# Patient Record
Sex: FEMALE | Race: WHITE | URBAN - METROPOLITAN AREA
[De-identification: names, ages, dates, MRNs, and addresses within clinical notes are randomized per-mention and may not be internally consistent; named-entity substitution may affect disease eponyms.]

---

## 2019-12-18 ENCOUNTER — OFFICE VISIT (OUTPATIENT)
Dept: ORTHOPEDIC SURGERY | Age: 79
End: 2019-12-18
Payer: MEDICARE

## 2019-12-18 VITALS
WEIGHT: 135 LBS | BODY MASS INDEX: 25.49 KG/M2 | DIASTOLIC BLOOD PRESSURE: 94 MMHG | SYSTOLIC BLOOD PRESSURE: 156 MMHG | HEART RATE: 75 BPM | HEIGHT: 61 IN

## 2019-12-18 DIAGNOSIS — S52.501A CLOSED FRACTURE OF DISTAL END OF RIGHT RADIUS, UNSPECIFIED FRACTURE MORPHOLOGY, INITIAL ENCOUNTER: ICD-10-CM

## 2019-12-18 DIAGNOSIS — M25.531 RIGHT WRIST PAIN: Primary | ICD-10-CM

## 2019-12-18 PROCEDURE — G8427 DOCREV CUR MEDS BY ELIG CLIN: HCPCS | Performed by: PHYSICIAN ASSISTANT

## 2019-12-18 PROCEDURE — G8417 CALC BMI ABV UP PARAM F/U: HCPCS | Performed by: PHYSICIAN ASSISTANT

## 2019-12-18 PROCEDURE — 1036F TOBACCO NON-USER: CPT | Performed by: PHYSICIAN ASSISTANT

## 2019-12-18 PROCEDURE — G8484 FLU IMMUNIZE NO ADMIN: HCPCS | Performed by: PHYSICIAN ASSISTANT

## 2019-12-18 PROCEDURE — 1090F PRES/ABSN URINE INCON ASSESS: CPT | Performed by: PHYSICIAN ASSISTANT

## 2019-12-18 PROCEDURE — 1123F ACP DISCUSS/DSCN MKR DOCD: CPT | Performed by: PHYSICIAN ASSISTANT

## 2019-12-18 PROCEDURE — 99203 OFFICE O/P NEW LOW 30 MIN: CPT | Performed by: PHYSICIAN ASSISTANT

## 2019-12-18 PROCEDURE — G8400 PT W/DXA NO RESULTS DOC: HCPCS | Performed by: PHYSICIAN ASSISTANT

## 2019-12-18 PROCEDURE — 4040F PNEUMOC VAC/ADMIN/RCVD: CPT | Performed by: PHYSICIAN ASSISTANT

## 2019-12-18 PROCEDURE — L3908 WHO COCK-UP NONMOLDE PRE OTS: HCPCS | Performed by: PHYSICIAN ASSISTANT

## 2019-12-18 RX ORDER — FLUOXETINE HYDROCHLORIDE 20 MG/1
CAPSULE ORAL
COMMUNITY
Start: 2019-09-23

## 2019-12-18 RX ORDER — LOSARTAN POTASSIUM 25 MG/1
1 TABLET ORAL
COMMUNITY
Start: 2019-06-19

## 2019-12-18 RX ORDER — SIMVASTATIN 40 MG
1 TABLET ORAL
COMMUNITY
Start: 2019-09-17

## 2019-12-18 SDOH — HEALTH STABILITY: MENTAL HEALTH: HOW OFTEN DO YOU HAVE A DRINK CONTAINING ALCOHOL?: NEVER

## 2019-12-19 ENCOUNTER — OFFICE VISIT (OUTPATIENT)
Dept: ORTHOPEDIC SURGERY | Age: 79
End: 2019-12-19
Payer: MEDICARE

## 2019-12-19 VITALS
HEIGHT: 61 IN | HEART RATE: 69 BPM | WEIGHT: 135 LBS | RESPIRATION RATE: 16 BRPM | DIASTOLIC BLOOD PRESSURE: 86 MMHG | BODY MASS INDEX: 25.49 KG/M2 | SYSTOLIC BLOOD PRESSURE: 158 MMHG

## 2019-12-19 DIAGNOSIS — S52.501A CLOSED FRACTURE OF DISTAL END OF RIGHT RADIUS, UNSPECIFIED FRACTURE MORPHOLOGY, INITIAL ENCOUNTER: Primary | ICD-10-CM

## 2019-12-19 PROCEDURE — 1090F PRES/ABSN URINE INCON ASSESS: CPT | Performed by: ORTHOPAEDIC SURGERY

## 2019-12-19 PROCEDURE — 25600 CLTX DST RDL FX/EPHYS SEP WO: CPT | Performed by: ORTHOPAEDIC SURGERY

## 2019-12-19 PROCEDURE — 4040F PNEUMOC VAC/ADMIN/RCVD: CPT | Performed by: ORTHOPAEDIC SURGERY

## 2019-12-19 PROCEDURE — 99213 OFFICE O/P EST LOW 20 MIN: CPT | Performed by: ORTHOPAEDIC SURGERY

## 2019-12-19 PROCEDURE — G8400 PT W/DXA NO RESULTS DOC: HCPCS | Performed by: ORTHOPAEDIC SURGERY

## 2019-12-19 PROCEDURE — G8417 CALC BMI ABV UP PARAM F/U: HCPCS | Performed by: ORTHOPAEDIC SURGERY

## 2019-12-19 PROCEDURE — 1123F ACP DISCUSS/DSCN MKR DOCD: CPT | Performed by: ORTHOPAEDIC SURGERY

## 2019-12-19 PROCEDURE — 1036F TOBACCO NON-USER: CPT | Performed by: ORTHOPAEDIC SURGERY

## 2019-12-19 PROCEDURE — G8427 DOCREV CUR MEDS BY ELIG CLIN: HCPCS | Performed by: ORTHOPAEDIC SURGERY

## 2019-12-19 PROCEDURE — G8484 FLU IMMUNIZE NO ADMIN: HCPCS | Performed by: ORTHOPAEDIC SURGERY

## 2020-01-23 ENCOUNTER — TELEPHONE (OUTPATIENT)
Dept: ORTHOPEDIC SURGERY | Age: 80
End: 2020-01-23

## 2020-01-23 NOTE — TELEPHONE ENCOUNTER
Called and left message for Calleen Rummage to call office back    **what exactly is patient wanting to do more? Or what sort of activities? **

## 2020-01-23 NOTE — TELEPHONE ENCOUNTER
Pt is calling to see if she can use her wrist more.   She stated that she wanted to start extending the use of her wrist.

## 2020-02-04 ENCOUNTER — OFFICE VISIT (OUTPATIENT)
Dept: ORTHOPEDIC SURGERY | Age: 80
End: 2020-02-04

## 2020-02-04 VITALS
HEART RATE: 72 BPM | DIASTOLIC BLOOD PRESSURE: 90 MMHG | RESPIRATION RATE: 16 BRPM | BODY MASS INDEX: 24.55 KG/M2 | WEIGHT: 130 LBS | HEIGHT: 61 IN | SYSTOLIC BLOOD PRESSURE: 141 MMHG

## 2020-02-04 PROCEDURE — 99024 POSTOP FOLLOW-UP VISIT: CPT | Performed by: NURSE PRACTITIONER

## 2020-02-04 PROCEDURE — APPNB15 APP NON BILLABLE TIME 0-15 MINS: Performed by: NURSE PRACTITIONER

## 2020-02-04 NOTE — PROGRESS NOTES
CHIEF COMPLAINT: Right wrist pain/ minimally displaced distal radius fracture. DATE OF INJURY: 12/16/2019, DOT 12/19/2019    HISTORY:  Ms. Paige Sim is a 67 y.o.  female right handed who presents today for evaluation of a right wrist injury, which occurred when she fell. She was first seen and evaluated in [address] [place] [address], when she was x-rayed and splinted, and asked to f/u with me. The patient denies any other injuries. She has been in a brace. Rates pain a 0/10 VAS and is doing much better. No numbness or tingling sensation. Past Medical History:   Diagnosis Date    Vasculitis ([address] 2014       History reviewed. No pertinent surgical history.     Social History     Socioeconomic History    Marital status: Single     Spouse name: Not on file    Number of children: Not on file    Years of education: Not on file    Highest education level: Not on file   Occupational History    Not on file   Social Needs    Financial resource strain: Not on file    Food insecurity:     Worry: Not on file     Inability: Not on file    Transportation needs:     Medical: Not on file     Non-medical: Not on file   Tobacco Use    Smoking status: Never Smoker    Smokeless tobacco: Never Used   Substance and Sexual Activity    Alcohol use: Never     Frequency: Never    Drug use: Not on file    Sexual activity: Not on file   Lifestyle    Physical activity:     Days per week: Not on file     Minutes per session: Not on file    Stress: Not on file   Relationships    Social connections:     Talks on phone: Not on file     Gets together: Not on file     Attends Restoration service: Not on file     Active member of club or organization: Not on file     Attends meetings of clubs or organizations: Not on file     Relationship status: Not on file    Intimate partner violence:     Fear of current or ex partner: Not on file     Emotionally abused: Not on file     Physically abused: Not on file     Forced sexual activity: Not on file

## 2020-02-17 ENCOUNTER — TELEPHONE (OUTPATIENT)
Dept: ORTHOPEDIC SURGERY | Age: 80
End: 2020-02-17

## 2020-02-17 NOTE — TELEPHONE ENCOUNTER
Called and spoke with patient. She states she is having shocks in her head when she lays down and thinks it is from her wrist. She states she is being treated by this from her PCP and already had a CT scan of the head and they feel it is due to her healing from the fall. Recommended she continue to f/u with her PCP in regards to this for this is not something Dr Mariza Victoria treats. Pt in agreement to plan.

## 2025-01-09 ENCOUNTER — HOSPITAL ENCOUNTER (INPATIENT)
Age: 85
LOS: 1 days | Discharge: NURSING, REHAB OR LONG-TERM CARE | DRG: 522 | End: 2025-01-10
Attending: EMERGENCY MEDICINE
Payer: MEDICARE

## 2025-01-09 ENCOUNTER — ANESTHESIA (OUTPATIENT)
Dept: OPERATING ROOM | Age: 85
DRG: 522 | End: 2025-01-09
Payer: MEDICARE

## 2025-01-09 ENCOUNTER — APPOINTMENT (OUTPATIENT)
Dept: GENERAL RADIOLOGY | Age: 85
DRG: 522 | End: 2025-01-09
Payer: MEDICARE

## 2025-01-09 ENCOUNTER — ANESTHESIA EVENT (OUTPATIENT)
Dept: OPERATING ROOM | Age: 85
DRG: 522 | End: 2025-01-09
Payer: MEDICARE

## 2025-01-09 DIAGNOSIS — S72.001A CLOSED FRACTURE OF RIGHT HIP, INITIAL ENCOUNTER (HCC): Primary | ICD-10-CM

## 2025-01-09 DIAGNOSIS — S72.001A CLOSED DISPLACED FRACTURE OF RIGHT FEMORAL NECK (HCC): ICD-10-CM

## 2025-01-09 PROBLEM — S72.141A INTERTROCHANTERIC FRACTURE OF RIGHT FEMUR, CLOSED, INITIAL ENCOUNTER (HCC): Status: ACTIVE | Noted: 2025-01-09

## 2025-01-09 LAB
ALBUMIN SERPL-MCNC: 3.7 G/DL (ref 3.4–5)
ALBUMIN/GLOB SERPL: 1.3 {RATIO} (ref 1.1–2.2)
ALP SERPL-CCNC: 64 U/L (ref 40–129)
ALT SERPL-CCNC: 19 U/L (ref 10–40)
ANION GAP SERPL CALCULATED.3IONS-SCNC: 10 MMOL/L (ref 3–16)
AST SERPL-CCNC: 31 U/L (ref 15–37)
BASOPHILS # BLD: 0 K/UL (ref 0–0.2)
BASOPHILS NFR BLD: 0.3 %
BILIRUB SERPL-MCNC: 0.8 MG/DL (ref 0–1)
BUN SERPL-MCNC: 19 MG/DL (ref 7–20)
CALCIUM SERPL-MCNC: 9.1 MG/DL (ref 8.3–10.6)
CHLORIDE SERPL-SCNC: 101 MMOL/L (ref 99–110)
CO2 SERPL-SCNC: 25 MMOL/L (ref 21–32)
CREAT SERPL-MCNC: 0.7 MG/DL (ref 0.6–1.2)
DEPRECATED RDW RBC AUTO: 13 % (ref 12.4–15.4)
EOSINOPHIL # BLD: 0 K/UL (ref 0–0.6)
EOSINOPHIL NFR BLD: 0 %
GFR SERPLBLD CREATININE-BSD FMLA CKD-EPI: 85 ML/MIN/{1.73_M2}
GLUCOSE SERPL-MCNC: 136 MG/DL (ref 70–99)
HCT VFR BLD AUTO: 40 % (ref 36–48)
HGB BLD-MCNC: 13.4 G/DL (ref 12–16)
INR PPP: 1.01 (ref 0.85–1.15)
LYMPHOCYTES # BLD: 0.7 K/UL (ref 1–5.1)
LYMPHOCYTES NFR BLD: 5.7 %
MCH RBC QN AUTO: 31.4 PG (ref 26–34)
MCHC RBC AUTO-ENTMCNC: 33.6 G/DL (ref 31–36)
MCV RBC AUTO: 93.3 FL (ref 80–100)
MONOCYTES # BLD: 0.9 K/UL (ref 0–1.3)
MONOCYTES NFR BLD: 7.6 %
NEUTROPHILS # BLD: 10.2 K/UL (ref 1.7–7.7)
NEUTROPHILS NFR BLD: 86.4 %
PLATELET # BLD AUTO: 193 K/UL (ref 135–450)
PMV BLD AUTO: 9.5 FL (ref 5–10.5)
POTASSIUM SERPL-SCNC: 4.6 MMOL/L (ref 3.5–5.1)
PROT SERPL-MCNC: 6.5 G/DL (ref 6.4–8.2)
PROTHROMBIN TIME: 13.5 SEC (ref 11.9–14.9)
RBC # BLD AUTO: 4.28 M/UL (ref 4–5.2)
SODIUM SERPL-SCNC: 136 MMOL/L (ref 136–145)
WBC # BLD AUTO: 11.8 K/UL (ref 4–11)

## 2025-01-09 PROCEDURE — 71045 X-RAY EXAM CHEST 1 VIEW: CPT

## 2025-01-09 PROCEDURE — 6360000002 HC RX W HCPCS: Performed by: ORTHOPAEDIC SURGERY

## 2025-01-09 PROCEDURE — 96374 THER/PROPH/DIAG INJ IV PUSH: CPT

## 2025-01-09 PROCEDURE — 6360000002 HC RX W HCPCS: Performed by: NURSE PRACTITIONER

## 2025-01-09 PROCEDURE — 36415 COLL VENOUS BLD VENIPUNCTURE: CPT

## 2025-01-09 PROCEDURE — 3600000005 HC SURGERY LEVEL 5 BASE: Performed by: ORTHOPAEDIC SURGERY

## 2025-01-09 PROCEDURE — 0SRR0JZ REPLACEMENT OF RIGHT HIP JOINT, FEMORAL SURFACE WITH SYNTHETIC SUBSTITUTE, OPEN APPROACH: ICD-10-PCS | Performed by: ORTHOPAEDIC SURGERY

## 2025-01-09 PROCEDURE — 6360000002 HC RX W HCPCS

## 2025-01-09 PROCEDURE — 73030 X-RAY EXAM OF SHOULDER: CPT

## 2025-01-09 PROCEDURE — 3700000000 HC ANESTHESIA ATTENDED CARE: Performed by: ORTHOPAEDIC SURGERY

## 2025-01-09 PROCEDURE — 1200000000 HC SEMI PRIVATE

## 2025-01-09 PROCEDURE — 3600000015 HC SURGERY LEVEL 5 ADDTL 15MIN: Performed by: ORTHOPAEDIC SURGERY

## 2025-01-09 PROCEDURE — 73501 X-RAY EXAM HIP UNI 1 VIEW: CPT

## 2025-01-09 PROCEDURE — 2580000003 HC RX 258

## 2025-01-09 PROCEDURE — 85610 PROTHROMBIN TIME: CPT

## 2025-01-09 PROCEDURE — 2500000003 HC RX 250 WO HCPCS: Performed by: ORTHOPAEDIC SURGERY

## 2025-01-09 PROCEDURE — 99285 EMERGENCY DEPT VISIT HI MDM: CPT

## 2025-01-09 PROCEDURE — 3700000001 HC ADD 15 MINUTES (ANESTHESIA): Performed by: ORTHOPAEDIC SURGERY

## 2025-01-09 PROCEDURE — 94760 N-INVAS EAR/PLS OXIMETRY 1: CPT

## 2025-01-09 PROCEDURE — 6370000000 HC RX 637 (ALT 250 FOR IP): Performed by: ORTHOPAEDIC SURGERY

## 2025-01-09 PROCEDURE — 2500000003 HC RX 250 WO HCPCS

## 2025-01-09 PROCEDURE — 73552 X-RAY EXAM OF FEMUR 2/>: CPT

## 2025-01-09 PROCEDURE — 2580000003 HC RX 258: Performed by: NURSE PRACTITIONER

## 2025-01-09 PROCEDURE — 7100000001 HC PACU RECOVERY - ADDTL 15 MIN: Performed by: ORTHOPAEDIC SURGERY

## 2025-01-09 PROCEDURE — 72170 X-RAY EXAM OF PELVIS: CPT

## 2025-01-09 PROCEDURE — 6360000002 HC RX W HCPCS: Performed by: EMERGENCY MEDICINE

## 2025-01-09 PROCEDURE — 80053 COMPREHEN METABOLIC PANEL: CPT

## 2025-01-09 PROCEDURE — 2709999900 HC NON-CHARGEABLE SUPPLY: Performed by: ORTHOPAEDIC SURGERY

## 2025-01-09 PROCEDURE — 7100000000 HC PACU RECOVERY - FIRST 15 MIN: Performed by: ORTHOPAEDIC SURGERY

## 2025-01-09 PROCEDURE — 85025 COMPLETE CBC W/AUTO DIFF WBC: CPT

## 2025-01-09 PROCEDURE — C1776 JOINT DEVICE (IMPLANTABLE): HCPCS | Performed by: ORTHOPAEDIC SURGERY

## 2025-01-09 PROCEDURE — 96375 TX/PRO/DX INJ NEW DRUG ADDON: CPT

## 2025-01-09 DEVICE — AVENIR COMPLETE HIGH OFFSET COLLARED SIZE 3: Type: IMPLANTABLE DEVICE | Site: HIP | Status: FUNCTIONAL

## 2025-01-09 DEVICE — IMPLANTABLE DEVICE: Type: IMPLANTABLE DEVICE | Site: HIP | Status: FUNCTIONAL

## 2025-01-09 DEVICE — STEM FEM UNIV 0+ 28 MM HIP COCR: Type: IMPLANTABLE DEVICE | Site: HIP | Status: FUNCTIONAL

## 2025-01-09 RX ORDER — ACETAMINOPHEN 325 MG/1
650 TABLET ORAL EVERY 6 HOURS
Status: DISCONTINUED | OUTPATIENT
Start: 2025-01-09 | End: 2025-01-10 | Stop reason: HOSPADM

## 2025-01-09 RX ORDER — NALOXONE HYDROCHLORIDE 0.4 MG/ML
INJECTION, SOLUTION INTRAMUSCULAR; INTRAVENOUS; SUBCUTANEOUS PRN
Status: DISCONTINUED | OUTPATIENT
Start: 2025-01-09 | End: 2025-01-09 | Stop reason: HOSPADM

## 2025-01-09 RX ORDER — METHOCARBAMOL 100 MG/ML
INJECTION, SOLUTION INTRAMUSCULAR; INTRAVENOUS
Status: DISCONTINUED | OUTPATIENT
Start: 2025-01-09 | End: 2025-01-09 | Stop reason: SDUPTHER

## 2025-01-09 RX ORDER — SODIUM CHLORIDE 9 MG/ML
INJECTION, SOLUTION INTRAVENOUS PRN
Status: DISCONTINUED | OUTPATIENT
Start: 2025-01-09 | End: 2025-01-10 | Stop reason: HOSPADM

## 2025-01-09 RX ORDER — OXYCODONE HYDROCHLORIDE 5 MG/1
5 TABLET ORAL
Status: DISCONTINUED | OUTPATIENT
Start: 2025-01-09 | End: 2025-01-09 | Stop reason: HOSPADM

## 2025-01-09 RX ORDER — SODIUM CHLORIDE 0.9 % (FLUSH) 0.9 %
5-40 SYRINGE (ML) INJECTION EVERY 12 HOURS SCHEDULED
Status: DISCONTINUED | OUTPATIENT
Start: 2025-01-09 | End: 2025-01-09 | Stop reason: HOSPADM

## 2025-01-09 RX ORDER — LIDOCAINE HYDROCHLORIDE 20 MG/ML
INJECTION, SOLUTION EPIDURAL; INFILTRATION; INTRACAUDAL; PERINEURAL
Status: DISCONTINUED | OUTPATIENT
Start: 2025-01-09 | End: 2025-01-09 | Stop reason: SDUPTHER

## 2025-01-09 RX ORDER — FENTANYL CITRATE 0.05 MG/ML
25 INJECTION, SOLUTION INTRAMUSCULAR; INTRAVENOUS EVERY 5 MIN PRN
Status: DISCONTINUED | OUTPATIENT
Start: 2025-01-09 | End: 2025-01-09 | Stop reason: HOSPADM

## 2025-01-09 RX ORDER — TRANEXAMIC ACID 10 MG/ML
INJECTION, SOLUTION INTRAVENOUS
Status: DISCONTINUED | OUTPATIENT
Start: 2025-01-09 | End: 2025-01-09 | Stop reason: SDUPTHER

## 2025-01-09 RX ORDER — SODIUM CHLORIDE 0.9 % (FLUSH) 0.9 %
5-40 SYRINGE (ML) INJECTION EVERY 12 HOURS SCHEDULED
Status: DISCONTINUED | OUTPATIENT
Start: 2025-01-09 | End: 2025-01-10 | Stop reason: HOSPADM

## 2025-01-09 RX ORDER — SODIUM CHLORIDE 0.9 % (FLUSH) 0.9 %
5-40 SYRINGE (ML) INJECTION PRN
Status: DISCONTINUED | OUTPATIENT
Start: 2025-01-09 | End: 2025-01-09 | Stop reason: HOSPADM

## 2025-01-09 RX ORDER — SODIUM CHLORIDE, SODIUM LACTATE, POTASSIUM CHLORIDE, CALCIUM CHLORIDE 600; 310; 30; 20 MG/100ML; MG/100ML; MG/100ML; MG/100ML
INJECTION, SOLUTION INTRAVENOUS CONTINUOUS
Status: DISCONTINUED | OUTPATIENT
Start: 2025-01-09 | End: 2025-01-09

## 2025-01-09 RX ORDER — LOSARTAN POTASSIUM 25 MG/1
25 TABLET ORAL 2 TIMES DAILY
Status: DISCONTINUED | OUTPATIENT
Start: 2025-01-09 | End: 2025-01-10 | Stop reason: HOSPADM

## 2025-01-09 RX ORDER — PHENYLEPHRINE HCL IN 0.9% NACL 1 MG/10 ML
SYRINGE (ML) INTRAVENOUS
Status: DISCONTINUED | OUTPATIENT
Start: 2025-01-09 | End: 2025-01-09 | Stop reason: SDUPTHER

## 2025-01-09 RX ORDER — ONDANSETRON 2 MG/ML
4 INJECTION INTRAMUSCULAR; INTRAVENOUS
Status: DISCONTINUED | OUTPATIENT
Start: 2025-01-09 | End: 2025-01-09 | Stop reason: HOSPADM

## 2025-01-09 RX ORDER — SODIUM CHLORIDE 9 MG/ML
INJECTION, SOLUTION INTRAVENOUS PRN
Status: DISCONTINUED | OUTPATIENT
Start: 2025-01-09 | End: 2025-01-09 | Stop reason: HOSPADM

## 2025-01-09 RX ORDER — DROPERIDOL 2.5 MG/ML
0.62 INJECTION, SOLUTION INTRAMUSCULAR; INTRAVENOUS
Status: DISCONTINUED | OUTPATIENT
Start: 2025-01-09 | End: 2025-01-09 | Stop reason: HOSPADM

## 2025-01-09 RX ORDER — PROPOFOL 10 MG/ML
INJECTION, EMULSION INTRAVENOUS
Status: DISCONTINUED | OUTPATIENT
Start: 2025-01-09 | End: 2025-01-09 | Stop reason: SDUPTHER

## 2025-01-09 RX ORDER — MAGNESIUM HYDROXIDE 1200 MG/15ML
LIQUID ORAL CONTINUOUS PRN
Status: DISCONTINUED | OUTPATIENT
Start: 2025-01-09 | End: 2025-01-09 | Stop reason: HOSPADM

## 2025-01-09 RX ORDER — ONDANSETRON 2 MG/ML
4 INJECTION INTRAMUSCULAR; INTRAVENOUS ONCE
Status: COMPLETED | OUTPATIENT
Start: 2025-01-09 | End: 2025-01-09

## 2025-01-09 RX ORDER — SODIUM CHLORIDE 9 MG/ML
INJECTION, SOLUTION INTRAVENOUS CONTINUOUS
Status: DISCONTINUED | OUTPATIENT
Start: 2025-01-09 | End: 2025-01-10 | Stop reason: HOSPADM

## 2025-01-09 RX ORDER — DEXAMETHASONE SODIUM PHOSPHATE 4 MG/ML
INJECTION, SOLUTION INTRA-ARTICULAR; INTRALESIONAL; INTRAMUSCULAR; INTRAVENOUS; SOFT TISSUE
Status: DISCONTINUED | OUTPATIENT
Start: 2025-01-09 | End: 2025-01-09 | Stop reason: SDUPTHER

## 2025-01-09 RX ORDER — ACETAMINOPHEN 325 MG/1
650 TABLET ORAL EVERY 6 HOURS PRN
Status: DISCONTINUED | OUTPATIENT
Start: 2025-01-09 | End: 2025-01-10 | Stop reason: HOSPADM

## 2025-01-09 RX ORDER — MORPHINE SULFATE 2 MG/ML
2 INJECTION, SOLUTION INTRAMUSCULAR; INTRAVENOUS ONCE
Status: COMPLETED | OUTPATIENT
Start: 2025-01-09 | End: 2025-01-09

## 2025-01-09 RX ORDER — ONDANSETRON 2 MG/ML
4 INJECTION INTRAMUSCULAR; INTRAVENOUS EVERY 6 HOURS PRN
Status: DISCONTINUED | OUTPATIENT
Start: 2025-01-09 | End: 2025-01-09

## 2025-01-09 RX ORDER — SODIUM CHLORIDE 0.9 % (FLUSH) 0.9 %
5-40 SYRINGE (ML) INJECTION PRN
Status: DISCONTINUED | OUTPATIENT
Start: 2025-01-09 | End: 2025-01-10 | Stop reason: HOSPADM

## 2025-01-09 RX ORDER — FLUOXETINE 10 MG/1
10 CAPSULE ORAL DAILY
Status: DISCONTINUED | OUTPATIENT
Start: 2025-01-09 | End: 2025-01-09

## 2025-01-09 RX ORDER — ONDANSETRON 4 MG/1
4 TABLET, ORALLY DISINTEGRATING ORAL EVERY 8 HOURS PRN
Status: DISCONTINUED | OUTPATIENT
Start: 2025-01-09 | End: 2025-01-09

## 2025-01-09 RX ORDER — OXYCODONE HYDROCHLORIDE 5 MG/1
5 TABLET ORAL EVERY 4 HOURS PRN
Status: DISCONTINUED | OUTPATIENT
Start: 2025-01-09 | End: 2025-01-10 | Stop reason: HOSPADM

## 2025-01-09 RX ORDER — ONDANSETRON 2 MG/ML
4 INJECTION INTRAMUSCULAR; INTRAVENOUS EVERY 6 HOURS PRN
Status: DISCONTINUED | OUTPATIENT
Start: 2025-01-09 | End: 2025-01-10 | Stop reason: HOSPADM

## 2025-01-09 RX ORDER — MORPHINE SULFATE 4 MG/ML
4 INJECTION, SOLUTION INTRAMUSCULAR; INTRAVENOUS EVERY 4 HOURS PRN
Status: DISCONTINUED | OUTPATIENT
Start: 2025-01-09 | End: 2025-01-09

## 2025-01-09 RX ORDER — BRIMONIDINE TARTRATE, TIMOLOL MALEATE 2; 5 MG/ML; MG/ML
1 SOLUTION/ DROPS OPHTHALMIC EVERY 12 HOURS
COMMUNITY
Start: 2024-12-23

## 2025-01-09 RX ORDER — GLYCOPYRROLATE 0.2 MG/ML
INJECTION INTRAMUSCULAR; INTRAVENOUS
Status: DISCONTINUED | OUTPATIENT
Start: 2025-01-09 | End: 2025-01-09 | Stop reason: SDUPTHER

## 2025-01-09 RX ORDER — SODIUM CHLORIDE 0.9 % (FLUSH) 0.9 %
5-40 SYRINGE (ML) INJECTION PRN
Status: DISCONTINUED | OUTPATIENT
Start: 2025-01-09 | End: 2025-01-09

## 2025-01-09 RX ORDER — SUCCINYLCHOLINE/SOD CL,ISO/PF 200MG/10ML
SYRINGE (ML) INTRAVENOUS
Status: DISCONTINUED | OUTPATIENT
Start: 2025-01-09 | End: 2025-01-09 | Stop reason: SDUPTHER

## 2025-01-09 RX ORDER — POLYETHYLENE GLYCOL 3350 17 G/17G
17 POWDER, FOR SOLUTION ORAL DAILY PRN
Status: DISCONTINUED | OUTPATIENT
Start: 2025-01-09 | End: 2025-01-10 | Stop reason: HOSPADM

## 2025-01-09 RX ORDER — MAGNESIUM SULFATE IN WATER 40 MG/ML
2000 INJECTION, SOLUTION INTRAVENOUS PRN
Status: DISCONTINUED | OUTPATIENT
Start: 2025-01-09 | End: 2025-01-10 | Stop reason: HOSPADM

## 2025-01-09 RX ORDER — FENTANYL CITRATE 50 UG/ML
INJECTION, SOLUTION INTRAMUSCULAR; INTRAVENOUS
Status: DISCONTINUED | OUTPATIENT
Start: 2025-01-09 | End: 2025-01-09 | Stop reason: SDUPTHER

## 2025-01-09 RX ORDER — SENNA AND DOCUSATE SODIUM 50; 8.6 MG/1; MG/1
1 TABLET, FILM COATED ORAL 2 TIMES DAILY
Status: DISCONTINUED | OUTPATIENT
Start: 2025-01-09 | End: 2025-01-10 | Stop reason: HOSPADM

## 2025-01-09 RX ORDER — ROCURONIUM BROMIDE 10 MG/ML
INJECTION, SOLUTION INTRAVENOUS
Status: DISCONTINUED | OUTPATIENT
Start: 2025-01-09 | End: 2025-01-09 | Stop reason: SDUPTHER

## 2025-01-09 RX ORDER — OXYCODONE HYDROCHLORIDE 10 MG/1
10 TABLET ORAL EVERY 4 HOURS PRN
Status: DISCONTINUED | OUTPATIENT
Start: 2025-01-09 | End: 2025-01-10 | Stop reason: HOSPADM

## 2025-01-09 RX ORDER — ATORVASTATIN CALCIUM 20 MG/1
20 TABLET, FILM COATED ORAL NIGHTLY
Status: DISCONTINUED | OUTPATIENT
Start: 2025-01-09 | End: 2025-01-10 | Stop reason: HOSPADM

## 2025-01-09 RX ORDER — ASPIRIN 81 MG/1
81 TABLET ORAL 2 TIMES DAILY
Status: DISCONTINUED | OUTPATIENT
Start: 2025-01-10 | End: 2025-01-10 | Stop reason: HOSPADM

## 2025-01-09 RX ORDER — ONDANSETRON 2 MG/ML
INJECTION INTRAMUSCULAR; INTRAVENOUS
Status: DISCONTINUED | OUTPATIENT
Start: 2025-01-09 | End: 2025-01-09 | Stop reason: SDUPTHER

## 2025-01-09 RX ORDER — PROMETHAZINE HYDROCHLORIDE 25 MG/1
12.5 TABLET ORAL EVERY 6 HOURS PRN
Status: DISCONTINUED | OUTPATIENT
Start: 2025-01-09 | End: 2025-01-10 | Stop reason: HOSPADM

## 2025-01-09 RX ORDER — POTASSIUM CHLORIDE 7.45 MG/ML
10 INJECTION INTRAVENOUS PRN
Status: DISCONTINUED | OUTPATIENT
Start: 2025-01-09 | End: 2025-01-10 | Stop reason: HOSPADM

## 2025-01-09 RX ORDER — POTASSIUM CHLORIDE 1500 MG/1
40 TABLET, EXTENDED RELEASE ORAL PRN
Status: DISCONTINUED | OUTPATIENT
Start: 2025-01-09 | End: 2025-01-10 | Stop reason: HOSPADM

## 2025-01-09 RX ORDER — ACETAMINOPHEN 650 MG/1
650 SUPPOSITORY RECTAL EVERY 6 HOURS PRN
Status: DISCONTINUED | OUTPATIENT
Start: 2025-01-09 | End: 2025-01-10 | Stop reason: HOSPADM

## 2025-01-09 RX ADMIN — Medication 200 MCG: at 12:32

## 2025-01-09 RX ADMIN — ACETAMINOPHEN 325MG 650 MG: 325 TABLET ORAL at 17:55

## 2025-01-09 RX ADMIN — Medication 80 MG: at 11:39

## 2025-01-09 RX ADMIN — TRANEXAMIC ACID 1 G: 10 INJECTION, SOLUTION INTRAVENOUS at 12:50

## 2025-01-09 RX ADMIN — ONDANSETRON 4 MG: 2 INJECTION, SOLUTION INTRAMUSCULAR; INTRAVENOUS at 02:02

## 2025-01-09 RX ADMIN — ROCURONIUM BROMIDE 25 MG: 10 SOLUTION INTRAVENOUS at 11:53

## 2025-01-09 RX ADMIN — Medication 200 MCG: at 12:44

## 2025-01-09 RX ADMIN — SODIUM CHLORIDE, POTASSIUM CHLORIDE, SODIUM LACTATE AND CALCIUM CHLORIDE: 600; 310; 30; 20 INJECTION, SOLUTION INTRAVENOUS at 05:46

## 2025-01-09 RX ADMIN — METHOCARBAMOL 50 MG: 100 INJECTION INTRAMUSCULAR; INTRAVENOUS at 12:04

## 2025-01-09 RX ADMIN — Medication 200 MCG: at 12:38

## 2025-01-09 RX ADMIN — Medication 200 MCG: at 12:23

## 2025-01-09 RX ADMIN — METHOCARBAMOL 100 MG: 100 INJECTION INTRAMUSCULAR; INTRAVENOUS at 12:05

## 2025-01-09 RX ADMIN — LIDOCAINE HYDROCHLORIDE 60 MG: 20 INJECTION, SOLUTION EPIDURAL; INFILTRATION; INTRACAUDAL; PERINEURAL at 11:39

## 2025-01-09 RX ADMIN — MORPHINE SULFATE 4 MG: 4 INJECTION, SOLUTION INTRAMUSCULAR; INTRAVENOUS at 04:34

## 2025-01-09 RX ADMIN — PROPOFOL 80 MG: 10 INJECTION, EMULSION INTRAVENOUS at 11:39

## 2025-01-09 RX ADMIN — METHOCARBAMOL 50 MG: 100 INJECTION INTRAMUSCULAR; INTRAVENOUS at 12:03

## 2025-01-09 RX ADMIN — MORPHINE SULFATE 4 MG: 4 INJECTION, SOLUTION INTRAMUSCULAR; INTRAVENOUS at 08:48

## 2025-01-09 RX ADMIN — Medication 200 MCG: at 12:09

## 2025-01-09 RX ADMIN — ONDANSETRON 4 MG: 2 INJECTION INTRAMUSCULAR; INTRAVENOUS at 12:34

## 2025-01-09 RX ADMIN — WATER 2000 MG: 1 INJECTION INTRAMUSCULAR; INTRAVENOUS; SUBCUTANEOUS at 20:28

## 2025-01-09 RX ADMIN — ROCURONIUM BROMIDE 5 MG: 10 SOLUTION INTRAVENOUS at 11:39

## 2025-01-09 RX ADMIN — Medication 200 MCG: at 11:44

## 2025-01-09 RX ADMIN — GLYCOPYRROLATE 0.2 MG: 0.2 INJECTION INTRAMUSCULAR; INTRAVENOUS at 11:53

## 2025-01-09 RX ADMIN — FENTANYL CITRATE 50 MCG: 50 INJECTION INTRAMUSCULAR; INTRAVENOUS at 11:39

## 2025-01-09 RX ADMIN — METHOCARBAMOL 50 MG: 100 INJECTION INTRAMUSCULAR; INTRAVENOUS at 12:11

## 2025-01-09 RX ADMIN — MORPHINE SULFATE 2 MG: 2 INJECTION, SOLUTION INTRAMUSCULAR; INTRAVENOUS at 02:02

## 2025-01-09 RX ADMIN — SUGAMMADEX 200 MG: 100 INJECTION, SOLUTION INTRAVENOUS at 12:59

## 2025-01-09 RX ADMIN — Medication 100 MCG: at 11:54

## 2025-01-09 RX ADMIN — SENNOSIDES AND DOCUSATE SODIUM 1 TABLET: 50; 8.6 TABLET ORAL at 20:30

## 2025-01-09 RX ADMIN — ROCURONIUM BROMIDE 10 MG: 10 SOLUTION INTRAVENOUS at 12:23

## 2025-01-09 RX ADMIN — ACETAMINOPHEN 325MG 650 MG: 325 TABLET ORAL at 23:19

## 2025-01-09 RX ADMIN — Medication 200 MCG: at 12:50

## 2025-01-09 RX ADMIN — TRANEXAMIC ACID 1 G: 10 INJECTION, SOLUTION INTRAVENOUS at 11:45

## 2025-01-09 RX ADMIN — Medication 100 MCG: at 11:42

## 2025-01-09 RX ADMIN — ONDANSETRON 4 MG: 2 INJECTION, SOLUTION INTRAMUSCULAR; INTRAVENOUS at 17:54

## 2025-01-09 RX ADMIN — SODIUM CHLORIDE 2000 MG: 900 INJECTION INTRAVENOUS at 11:46

## 2025-01-09 RX ADMIN — ONDANSETRON 4 MG: 2 INJECTION, SOLUTION INTRAMUSCULAR; INTRAVENOUS at 08:54

## 2025-01-09 RX ADMIN — DEXAMETHASONE SODIUM PHOSPHATE 4 MG: 4 INJECTION, SOLUTION INTRAMUSCULAR; INTRAVENOUS at 11:44

## 2025-01-09 RX ADMIN — ATORVASTATIN CALCIUM 20 MG: 20 TABLET, FILM COATED ORAL at 20:30

## 2025-01-09 ASSESSMENT — PAIN DESCRIPTION - LOCATION
LOCATION: HIP
LOCATION: LEG
LOCATION: HIP

## 2025-01-09 ASSESSMENT — PAIN - FUNCTIONAL ASSESSMENT
PAIN_FUNCTIONAL_ASSESSMENT: 0-10
PAIN_FUNCTIONAL_ASSESSMENT: 0-10
PAIN_FUNCTIONAL_ASSESSMENT: PREVENTS OR INTERFERES WITH MANY ACTIVE NOT PASSIVE ACTIVITIES

## 2025-01-09 ASSESSMENT — PAIN SCALES - GENERAL
PAINLEVEL_OUTOF10: 8
PAINLEVEL_OUTOF10: 7
PAINLEVEL_OUTOF10: 8
PAINLEVEL_OUTOF10: 7
PAINLEVEL_OUTOF10: 0
PAINLEVEL_OUTOF10: 0

## 2025-01-09 ASSESSMENT — PAIN DESCRIPTION - ORIENTATION
ORIENTATION: UPPER;RIGHT
ORIENTATION: RIGHT
ORIENTATION: RIGHT

## 2025-01-09 ASSESSMENT — PAIN DESCRIPTION - DESCRIPTORS
DESCRIPTORS: DISCOMFORT
DESCRIPTORS: BURNING

## 2025-01-09 ASSESSMENT — LIFESTYLE VARIABLES
HOW MANY STANDARD DRINKS CONTAINING ALCOHOL DO YOU HAVE ON A TYPICAL DAY: 1 OR 2
HOW OFTEN DO YOU HAVE A DRINK CONTAINING ALCOHOL: 2-3 TIMES A WEEK

## 2025-01-09 NOTE — PROGRESS NOTES
Pt still very sleeping, will wake up to voice, answers questions and then falls back to sleep easily. Still denies pain or nausea at this time. Vss.

## 2025-01-09 NOTE — ED NOTES
Pt repositioned in bed utilizing the assistance of 4 nurses to prevent pain or injury to broken limb

## 2025-01-09 NOTE — OP NOTE
Anesthesia. The patient was positioned in the lateral decubitus position on the peg board. Appropriate pre op antibiotics were administered per SCIP. The right hip, pelvis and lower extremity were then prepped with ChloraPrep in standard fashion. We then draped out in standard fashion. We sealed off the skin with Ioban. We were then ready for incision. Using standard posterolateral skin incision we incised the skin and coagulated all bleeders with Bovie electrocautery. We then dissected down and split the gluteofemoral fascia in line with the muscle fibers. We then retracted both   anteriorly and posteriorly with the Charnley retractor. The sciatic nerve   was protected at all times. We then identified the piriformis. This was   tagged and released. We then took down the remainder of the short external rotators in standard fashion. We then went ahead and Td the capsule in standard fashion. We then expressed the proximal portion of the femur out of the wound. We did make a neck cut based on templating. We removed all bone laterally. We used our Charnley to open up the medullary canal of the femur. We then used our trochanteric router to ream out the bone laterally. We   then sequentially reamed up to 3 mm. We then sequentially broached up to a size 3 broach. This worked out extremely well. We got nice fit and fill of the femoral canal. We used our calcar planer to plane down the calcar. We did remove the femoral head without difficulty. The femoral head sized to 43 mm. We then   trialed the hip. Leg length appeared symmetric. The hip was   extremely stable, both anteriorly and posteriorly. We then went ahead and removed all trial components. We irrigated the acetabulum and the femoral canal rather copiously. We then were ready for insertion of the actual components. The components were seated without difficulty. We maintained approximately 15 to 20 degrees of anteversion. This worked out extremely well. The hip was  reduced and   again the hip was stable. We closed the capsule with interrupted   figure-of-eight #1 Vicryl stitches. We then closed the piriformis back with   interrupted vertical mattress #1 Vicryl stitches. We then closed the gluteal femoral fascia with a running deep strata fix.  We then closed the subcutaneous tissues with multiple layers of strata fix.  We then closed the skin with Prineo.  Sterile dressings were applied. There   were no complications. The patient was put on an abductor pillow.        Electronically signed by Ramy Jiang MD on 1/9/2025 at 11:18 AM

## 2025-01-09 NOTE — CONSULTS
PATIENT NAME:                     Merari Cummins  YOB: 1940   MEDICAL RECORD#         [id number]  SURGEON:                 Ramy Jiang MD      CHIEF COMPLAINT: right hip pain.     HISTORY OF PRESENT ILLNESS: Ms. Merari Cummins is a  84 y.o. female who tripped while walking in her home.  She landed onto her right hip.  She also hit her right shoulder.  The right shoulder pain has improved significantly.  She was unable to bear weight on the right lower extremity.   She was first seen and evaluated in the emergency room , where she was x-rayed and Orthopedics was consulted. The pain is sharp and not radiating. No other complaints.      PAST MEDICAL HISTORY:   Past Medical History:   Diagnosis Date    Vasculitis ([facility]) 2014        MEDICATIONS: The patient's medication reconciliation form was extensively reviewed and noted.     ALLERGIES:   Allergies   Allergen Reactions    Advil [Ibuprofen]      Kidney issues    Sulfa Antibiotics Hives        SOCIAL HISTORY:   Social History     Socioeconomic History    Marital status: Single     Spouse name: Not on file    Number of children: Not on file    Years of education: Not on file    Highest education level: Not on file   Occupational History    Not on file   Tobacco Use    Smoking status: Never    Smokeless tobacco: Never   Vaping Use    Vaping status: Never Used   Substance and Sexual Activity    Alcohol use: Never    Drug use: Not on file    Sexual activity: Not on file   Other Topics Concern    Not on file   Social History Narrative    Not on file     Social Determinants of Health     Financial Resource Strain: Not on file   Food Insecurity: No Food Insecurity (1/9/2025)    Hunger Vital Sign     Worried About Running Out of Food in the Last Year: Never true     Ran Out of Food in the Last Year: Never true   Transportation Needs: No Transportation Needs (1/9/2025)    PRAPARE - Transportation     Lack of Transportation (Medical): No     Lack of

## 2025-01-09 NOTE — PLAN OF CARE
Problem: Discharge Planning  Goal: Discharge to home or other facility with appropriate resources  1/9/2025 0916 by Selina Call RN  Flowsheets (Taken 1/9/2025 0916)  Discharge to home or other facility with appropriate resources:   Identify barriers to discharge with patient and caregiver   Arrange for needed discharge resources and transportation as appropriate   Identify discharge learning needs (meds, wound care, etc)   Refer to discharge planning if patient needs post-hospital services based on physician order or complex needs related to functional status, cognitive ability or social support system  1/9/2025 0529 by Diana Sanchez RN  Outcome: Progressing  Flowsheets (Taken 1/9/2025 0529)  Discharge to home or other facility with appropriate resources:   Identify barriers to discharge with patient and caregiver   Arrange for needed discharge resources and transportation as appropriate   Identify discharge learning needs (meds, wound care, etc)     Problem: Pain  Goal: Verbalizes/displays adequate comfort level or baseline comfort level  1/9/2025 0916 by Selina Call RN  Flowsheets (Taken 1/9/2025 0916)  Verbalizes/displays adequate comfort level or baseline comfort level:   Encourage patient to monitor pain and request assistance   Assess pain using appropriate pain scale   Administer analgesics based on type and severity of pain and evaluate response   Implement non-pharmacological measures as appropriate and evaluate response   Consider cultural and social influences on pain and pain management   Notify Licensed Independent Practitioner if interventions unsuccessful or patient reports new pain  1/9/2025 0529 by Diana Sanchez RN  Outcome: Progressing  Flowsheets (Taken 1/9/2025 0529)  Verbalizes/displays adequate comfort level or baseline comfort level:   Encourage patient to monitor pain and request assistance   Implement non-pharmacological measures as appropriate and evaluate response    Assess pain using appropriate pain scale   Administer analgesics based on type and severity of pain and evaluate response     Problem: Skin/Tissue Integrity  Goal: Absence of new skin breakdown  Description: 1.  Monitor for areas of redness and/or skin breakdown  2.  Assess vascular access sites hourly  3.  Every 4-6 hours minimum:  Change oxygen saturation probe site  4.  Every 4-6 hours:  If on nasal continuous positive airway pressure, respiratory therapy assess nares and determine need for appliance change or resting period.  1/9/2025 0529 by Diana Sanchez RN  Outcome: Progressing     Problem: Safety - Adult  Goal: Free from fall injury  1/9/2025 0916 by Selina Call RN  Flowsheets (Taken 1/9/2025 0916)  Free From Fall Injury: Instruct family/caregiver on patient safety  1/9/2025 0529 by Diana Sanchez RN  Outcome: Progressing  Flowsheets (Taken 1/9/2025 0529)  Free From Fall Injury: Instruct family/caregiver on patient safety     Problem: ABCDS Injury Assessment  Goal: Absence of physical injury  1/9/2025 0916 by Selina Call RN  Flowsheets (Taken 1/9/2025 0916)  Absence of Physical Injury: Implement safety measures based on patient assessment  1/9/2025 0529 by Diana Sanchez RN  Outcome: Progressing  Flowsheets (Taken 1/9/2025 0529)  Absence of Physical Injury: Implement safety measures based on patient assessment

## 2025-01-09 NOTE — CARE COORDINATION
Received message from yohana De La Cruz--they prefer [facility]. Patient had surgery today. PT/OT pending.     Electronically signed by Marylin Guerrero, ZELALEM, JGW, Case Management on 1/9/2025 at 4:30 PM  [place] [phone]

## 2025-01-09 NOTE — PROGRESS NOTES
Patient left the floor for surgery. Electronically signed by Selina Call RN on 1/9/2025 at 10:14 AM

## 2025-01-09 NOTE — H&P
you been hit, slapped, kicked or otherwise physically hurt by anyone? : No   Housing Stability: Not At Risk (4/24/2024)    Received from [facility] and [facility]    Housing/Utilities     Are you worried about losing your housing? : No     Within the past 12 months, have you ever stayed: outside, in a car, in a tent, in an overnight shelter, or temporarily in someone else's home (i.e. couch-surfing)?: No     Within the past 12 months, have you been unable to get utilities (heat, electricity) when it was really needed?: No       Medications:   Medications:    Infusions:   PRN Meds:     Labs      CBC: No results for input(s): \"WBC\", \"HGB\", \"PLT\" in the last 72 hours.  BMP:  No results for input(s): \"NA\", \"K\", \"CL\", \"CO2\", \"BUN\", \"CREATININE\", \"GLUCOSE\" in the last 72 hours.  Hepatic: No results for input(s): \"AST\", \"ALT\", \"BILITOT\", \"ALKPHOS\" in the last 72 hours.    Invalid input(s): \"ALB\"  Lipids: No results found for: \"CHOL\", \"HDL\", \"TRIG\"  Hemoglobin A1C: No results found for: \"LABA1C\"  TSH: No results found for: \"TSH\"  Troponin: No results found for: \"TROPONINT\"  Lactic Acid: No results for input(s): \"LACTA\" in the last 72 hours.  BNP: No results for input(s): \"PROBNP\" in the last 72 hours.  UA:  Lab Results   Component Value Date/Time    NITRU Negative 05/11/2020 09:53 AM    COLORU YELLOW 05/11/2020 09:53 AM    PHUR 6.0 05/11/2020 09:53 AM    WBCUA 4 05/11/2020 09:53 AM    RBCUA 3 05/11/2020 09:53 AM    CLARITYU Clear 05/11/2020 09:53 AM    LEUKOCYTESUR Negative 05/11/2020 09:53 AM    UROBILINOGEN 0.2 05/11/2020 09:53 AM    BILIRUBINUR Negative 05/11/2020 09:53 AM    BLOODU TRACE 05/11/2020 09:53 AM    GLUCOSEU Negative 05/11/2020 09:53 AM    KETUA Negative 05/11/2020 09:53 AM     Urine Cultures: No results found for: \"LABURIN\"  Blood Cultures: No results found for: \"BC\"  No results found for: \"BLOODCULT2\"  Organism: No results found for: \"ORG\"    Imaging/Diagnostics Last 24 Hours   XR FEMUR RIGHT  (MIN 2 VIEWS)    Result Date: 1/9/2025  EXAMINATION: 2 XRAY VIEWS OF THE RIGHT FEMUR 1/9/2025 2:13 am COMPARISON: None. HISTORY: ORDERING SYSTEM PROVIDED HISTORY: injury, pain TECHNOLOGIST PROVIDED HISTORY: Reason for exam:->injury, pain Reason for Exam: fall FINDINGS: Findings concerning for mildly displaced femoral neck fracture, suboptimally evaluated due to osseous overlap.  The distal femur otherwise appears intact.     Findings concerning for mildly displaced femoral neck fracture, suboptimally evaluated due to osseous overlap.         Electronically signed by Lyentte Murray MD on 1/9/2025 at 4:05 AM

## 2025-01-09 NOTE — PROGRESS NOTES
Patients CHG bath was completed.  Complete linen change, and gown change was completed.  New IV was placed in left arm per patient preference.  Checklist was completed and consent was signed and placed into chart.  Patient denies further needs at this time. Electronically signed by Selina Call RN on 1/9/2025 at 9:08 AM

## 2025-01-09 NOTE — ED PROVIDER NOTES
[facility] EMERGENCY DEPARTMENT    CHIEF COMPLAINT  Fall (Pt arrived via EMS, reports that she tripped and fell apx 12 hours ago when she slipper got caught on a loose piece of dustin; pt states that she was able to walk following the injury and went to work; pt went to bed as normal and woke up at 11pm with intense pain in her right leg and right shoulder; denies LOC, hitting head, or taking blood thinners; advil contraindicated per renal doc)       HISTORY OF PRESENT ILLNESS  Merari Cummins is a 84 y.o. female who presents to the ED complaining of patient presents for evaluation of right hip pain.  Patient states that she fell approximately 12 hours ago.  States that she got her slipper caught on loose dustin and ended up falling.  She states that since she fell she is actually been able to ambulate up until now.  She states that now she is having increased pain in the right hip to the point where she is unable to bear weight.  She denies any head injury denies any loss consciousness denies any associated numbness or weakness.  Pain is described as moderate aggravated with any type of movement.    No other complaints, modifying factors or associated symptoms.     I have reviewed the following from the nursing documentation:n     Past Medical History:   Diagnosis Date    Vasculitis ([facility]) 2014     No past surgical history on file.  Family History   Problem Relation Age of Onset    Stroke Mother     Heart Attack Father      Social History     Socioeconomic History    Marital status: Single     Spouse name: Not on file    Number of children: Not on file    Years of education: Not on file    Highest education level: Not on file   Occupational History    Not on file   Tobacco Use    Smoking status: Never    Smokeless tobacco: Never   Vaping Use    Vaping status: Never Used   Substance and Sexual Activity    Alcohol use: Never    Drug use: Not on file    Sexual activity: Not on file   Other Topics Concern

## 2025-01-09 NOTE — PROGRESS NOTES
Pt states is still very tired but ready to return to inpatient room. Denies pain or nausea at this time, report called to bel robert. Pt taken upstairs via bed by pacu staff, on 3L via NC at time of transfer back to room. On inpatient tele box at time of transfer. No signs of distress noted at time of transfer back to room.

## 2025-01-09 NOTE — ANESTHESIA POSTPROCEDURE EVALUATION
Department of Anesthesiology  Postprocedure Note    Patient: Merari Cummins  MRN: [id number]  YOB: 1940  Date of evaluation: 1/9/2025    Procedure Summary       Date: 01/09/25 Room / Location: [address]    Anesthesia Start: 1132 Anesthesia Stop: 1310    Procedure: RIGHT HIP HEMIARTHROPLASTY (Right: Hip) Diagnosis:       Closed fracture of right hip, initial encounter ([facility])      (Closed fracture of right hip, initial encounter ([facility]) [S72.001A])    Surgeons: Ramy Jiang MD Responsible Provider: Marvel Palacios MD    Anesthesia Type: general ASA Status: 3            Anesthesia Type: No value filed.    Isai Phase I: Isai Score: 8    Isai Phase II:      Anesthesia Post Evaluation    Patient location during evaluation: PACU  Patient participation: complete - patient participated  Level of consciousness: awake and alert  Pain score: 3  Airway patency: patent  Nausea & Vomiting: no nausea and no vomiting  Cardiovascular status: blood pressure returned to baseline  Respiratory status: acceptable  Hydration status: euvolemic  Pain management: adequate    No notable events documented.

## 2025-01-09 NOTE — PROGRESS NOTES
V2.0    [facility] [place]      Name:  Merari Cummins /Age/Sex: 1940  (84 y.o. female)   MRN & CSN:  [id number] & [id number] Encounter Date/Time: 2025 11:25 AM EST   Location:  OR/NONE PCP: Jaquan Smith MD     Attending:Moira Bryan MD       Hospital Day: 1    Assessment and Recommendations   Merari Cummins is a 84 y.o. female with pmh of vasculitis, HTN, HLD, depression who presents with Intertrochanteric fracture of right femur, closed, initial encounter ([facility])    Patient was examined this morning.  Feeling well.    Followed by orthopedic surgery will probably evaluate the patient and plan for patient to undergo surgical correction later today.        Plan:   Intertrochanter fracture of the right femur if  Due to mechanical fall  Plan of multiple portions orthopedic surgery consulted and plan for surgical correction   Pain control  PT OT to evaluate the patient after the surgery  Possible inpatient rehab    2.  Hypertension  Losartan 25 mg twice daily  Will continue to monitor vitals for further management if needed    3.  HLD  Atorvastatin 20 mg daily  .  Continue to follow monitor and manage chronic medical conditions with medication listed below    Diet Diet NPO   DVT Prophylaxis [] Lovenox, []  Heparin, [] SCDs, [] Ambulation,  [] Eliquis, [] Xarelto  [] Coumadin   Code Status Full Code   Disposition From: Home  Expected Disposition: SNF  Estimated Date of Discharge: 1-2 days  Patient requires continued admission due to upon surgical correction   Surrogate Decision Maker/ POA Self area     Personally reviewed Lab Studies and Imaging         Medical Decision Making:  The following items were considered in medical decision making:  Discussion of patient care with other providers  Reviewed clinical lab tests  Reviewed radiology tests  Reviewed other diagnostic tests/interventions  Independent review of radiologic images  Microbiology cultures and other micro tests reviewed      Subjective:     Chief  results found for: \"TSH\"  Troponin: No results found for: \"TROPONINT\"  Lactic Acid: No results for input(s): \"LACTA\" in the last 72 hours.  BNP: No results for input(s): \"PROBNP\" in the last 72 hours.  UA:  Lab Results   Component Value Date/Time    NITRU Negative 05/11/2020 09:53 AM    COLORU YELLOW 05/11/2020 09:53 AM    PHUR 6.0 05/11/2020 09:53 AM    WBCUA 4 05/11/2020 09:53 AM    RBCUA 3 05/11/2020 09:53 AM    CLARITYU Clear 05/11/2020 09:53 AM    LEUKOCYTESUR Negative 05/11/2020 09:53 AM    UROBILINOGEN 0.2 05/11/2020 09:53 AM    BILIRUBINUR Negative 05/11/2020 09:53 AM    BLOODU TRACE 05/11/2020 09:53 AM    GLUCOSEU Negative 05/11/2020 09:53 AM    KETUA Negative 05/11/2020 09:53 AM     Urine Cultures: No results found for: \"LABURIN\"  Blood Cultures: No results found for: \"BC\"  No results found for: \"BLOODCULT2\"  Organism: No results found for: \"ORG\"      Electronically signed by Moira Bryan MD on 1/9/2025 at 11:25 AM  Comment: Please note this report has been produced using speech recognition software and may contain errors related to that system including errors in grammar, punctuation, and spelling, as well as words and phrases that may be inappropriate. If there are any questions or concerns, please feel free to contact the dictating provider for clarification.

## 2025-01-09 NOTE — PROGRESS NOTES
Patient was admitted at 0500 to room 3129 via stretcher. Pt oriented to room, call light, policies and procedures, the menu and ordering. Call light within reach. Bed in lowest position, bed alarm on, and wheels locked. Pt verbalized understanding. No complaints, questions, or concerns at this time.

## 2025-01-09 NOTE — PLAN OF CARE
Problem: Discharge Planning  Goal: Discharge to home or other facility with appropriate resources  Outcome: Progressing  Flowsheets (Taken 1/9/2025 0529)  Discharge to home or other facility with appropriate resources:   Identify barriers to discharge with patient and caregiver   Arrange for needed discharge resources and transportation as appropriate   Identify discharge learning needs (meds, wound care, etc)     Problem: Pain  Goal: Verbalizes/displays adequate comfort level or baseline comfort level  Outcome: Progressing  Flowsheets (Taken 1/9/2025 0529)  Verbalizes/displays adequate comfort level or baseline comfort level:   Encourage patient to monitor pain and request assistance   Implement non-pharmacological measures as appropriate and evaluate response   Assess pain using appropriate pain scale   Administer analgesics based on type and severity of pain and evaluate response     Problem: Skin/Tissue Integrity  Goal: Absence of new skin breakdown  Description: 1.  Monitor for areas of redness and/or skin breakdown  2.  Assess vascular access sites hourly  3.  Every 4-6 hours minimum:  Change oxygen saturation probe site  4.  Every 4-6 hours:  If on nasal continuous positive airway pressure, respiratory therapy assess nares and determine need for appliance change or resting period.  Outcome: Progressing     Problem: Safety - Adult  Goal: Free from fall injury  Outcome: Progressing  Flowsheets (Taken 1/9/2025 0529)  Free From Fall Injury: Instruct family/caregiver on patient safety     Problem: ABCDS Injury Assessment  Goal: Absence of physical injury  Outcome: Progressing  Flowsheets (Taken 1/9/2025 0529)  Absence of Physical Injury: Implement safety measures based on patient assessment

## 2025-01-09 NOTE — ED NOTES
ED TO INPATIENT SBAR HANDOFF    Patient Name: Merari Cummins   Preferred Name: Merari  : 1940  84 y.o.   Family/Caregiver Present: no   Code Status Order: No Order  PO Status: NPO:No  Telemetry Order:   C-SSRS: Risk of Suicide: No Risk  Sitter no  n/a  Restraints:     Sepsis Risk Score      Situation  Chief Complaint   Patient presents with    Fall     Pt arrived via EMS, reports that she tripped and fell apx 12 hours ago when she slipper got caught on a loose piece of dustin; pt states that she was able to walk following the injury and went to work; pt went to bed as normal and woke up at 11pm with intense pain in her right leg and right shoulder; denies LOC, hitting head, or taking blood thinners; advil contraindicated per renal doc     Brief Description of Patient's Condition: Fracture near head of right femur  Mental Status: oriented  Arrived from:Home  Imaging:   XR FEMUR RIGHT (MIN 2 VIEWS)   Final Result   Findings concerning for mildly displaced femoral neck fracture, suboptimally   evaluated due to osseous overlap.         XR CHEST 1 VIEW    (Results Pending)   XR PELVIS (1-2 VIEWS)    (Results Pending)   XR SHOULDER RIGHT (MIN 2 VIEWS)    (Results Pending)     Abnormal labs: Abnormal Labs Reviewed - No data to display    Background  Allergies:   Allergies   Allergen Reactions    Advil [Ibuprofen]      Kidney issues    Sulfa Antibiotics Hives     History:   Past Medical History:   Diagnosis Date    Vasculitis (HCC)        Assessment  Vitals: MEWS Score: 1  Level of Consciousness: Alert (0)   Vitals:    25 0118   BP: (!) 143/82   Pulse: 72   Resp: 16   Temp: 97.7 °F (36.5 °C)   TempSrc: Oral   SpO2: 98%   Weight: 54.4 kg (120 lb)   Height: 1.524 m (5')     Deterioration Index (DI): Deterioration Index: 18.66  Deterioration Index (DI) Interventions Performed:    O2 Flow Rate:    O2 Device:    Cardiac Rhythm:    Critical Lab Results: [unfilled]  Cultures: Cultures:None  NIH Score: NIH     Active

## 2025-01-09 NOTE — PROGRESS NOTES
4 Eyes Skin Assessment     NAME:  Merari Cummins  YOB: 1940  MEDICAL RECORD NUMBER:  [id number]    The patient is being assessed for  Admission    I agree that at least one RN has performed a thorough Head to Toe Skin Assessment on the patient. ALL assessment sites listed below have been assessed.      Areas assessed by both nurses:    Head, Face, Ears, Shoulders, Back, Chest, Arms, Elbows, Hands, Sacrum. Buttock, Coccyx, Ischium, Legs. Feet and Heels, and Under Medical Devices         Does the Patient have a Wound? No noted wound(s)       Salo Prevention initiated by RN: Yes  Wound Care Orders initiated by RN: No    Pressure Injury (Stage 3,4, Unstageable, DTI, NWPT, and Complex wounds) if present, place Wound referral order by RN under : No    New Ostomies, if present place, Ostomy referral order under : No     Nurse 1 eSignature: Electronically signed by Diana Sanchez RN on 1/9/25 at 5:28 AM EST    **SHARE this note so that the co-signing nurse can place an eSignature**    Nurse 2 eSignature: {Esignature:[id number]}

## 2025-01-09 NOTE — PROGRESS NOTES
In bed. Alert and oriented. States fell in kitchen at home and injured her hip. She understands plan for hip surgery today per Dr Jiang and is agreeable to proceed. NPO for surgery

## 2025-01-09 NOTE — PROGRESS NOTES
Patient had not urinated prior to going down to surgery or returning.  When patient was bladder scanned, she was retaining 710mL.  Patient stated if she sat down she could urinate.  Patient was taken to the bathroom via stedy x2 and tolerated well.  Upon returning to bed a post void scan was completed that showed 0mL.  Patient sitting up in bed tolerating jello. Electronically signed by Selina Call RN on 1/9/2025 at 6:03 PM

## 2025-01-09 NOTE — PROGRESS NOTES
Pt to pacu from OR. Pt asleep at arrival, will open eyes to voice but falls back to sleep. Sating 88% RA at arrival, placed on 4L via Nc per CRNA. Placed on monitor, vss. Dressing to right hip clean dry and intact, ice pack applied. Abductor pillow in place. No signs of distress noted at this time, report obtained.

## 2025-01-09 NOTE — PLAN OF CARE
[facility] Orthopedic Surgery  Plan of Care Note          Orthopedic Plan of Care Note     Merari Cummins 84 y.o. presented to ED for fall    Imaging reviewed, and showed right femoral neck fracture, displaced    Plan:  - Admitted to medicine  -Keep NPO for hemiarthroplasty toady with Dr Ramy Jiang    Thank you very much for the kind consultation and allowing me to participate in this patient's care.  I will continue to keep you apprised of her progress.           Mariana Reese MD , MD 1/9/2025 7:19 AM

## 2025-01-09 NOTE — ANESTHESIA PRE PROCEDURE
Department of Anesthesiology  Preprocedure Note       Name:  Merari Cummins   Age:  84 y.o.  :  1940                                          MRN:  [id number]         Date:  2025      Surgeon: Surgeon(s):  Ramy Jiang MD    Procedure: Procedure(s):  RIGHT HIP HEMIARTHROPLASTY    Medications prior to admission:   Prior to Admission medications    Medication Sig Start Date End Date Taking? Authorizing Provider   losartan (COZAAR) 25 MG tablet Take 1 tablet by mouth in the morning and at bedtime 19  Yes ProviderRaymond MD   simvastatin (ZOCOR) 40 MG tablet Take 1 tablet by mouth daily 19  Yes ProviderRaymond MD       Current medications:    Current Facility-Administered Medications   Medication Dose Route Frequency Provider Last Rate Last Admin    losartan (COZAAR) tablet 25 mg  25 mg Oral BID Lynette Murray MD        atorvastatin (LIPITOR) tablet 20 mg  20 mg Oral Nightly Lynette Murray MD        sodium chloride flush 0.9 % injection 5-40 mL  5-40 mL IntraVENous 2 times per day Lynette Murray MD        sodium chloride flush 0.9 % injection 5-40 mL  5-40 mL IntraVENous PRN Lynette Murray MD        0.9 % sodium chloride infusion   IntraVENous PRN Lynette Murray MD        potassium chloride (KLOR-CON M) extended release tablet 40 mEq  40 mEq Oral PRN Lynette Murray MD        Or    potassium bicarb-citric acid (EFFER-K) effervescent tablet 40 mEq  40 mEq Oral PRN Lynette Murray MD        Or    potassium chloride 10 mEq/100 mL IVPB (Peripheral Line)  10 mEq IntraVENous PRN Lnyette Murray MD        magnesium sulfate 2000 mg in 50 mL IVPB premix  2,000 mg IntraVENous PRN Lynette Murray MD        ondansetron (ZOFRAN-ODT) disintegrating tablet 4 mg  4 mg Oral Q8H PRN Lynette Murray MD        Or    ondansetron (ZOFRAN) injection 4 mg  4 mg IntraVENous Q6H PRN Lynette Murray MD   4 mg at

## 2025-01-09 NOTE — CARE COORDINATION
Case Management Assessment  Initial Evaluation    Date/Time of Evaluation: 1/9/2025 3:39 PM  Assessment Completed by: ARTEM Hussein    If patient is discharged prior to next notation, then this note serves as note for discharge by case management.    Patient Name: Merari Cummins                   YOB: 1940  Diagnosis: Intertrochanteric fracture of right femur, closed, initial encounter ([facility]) [S72.141A]                   Date / Time: 1/9/2025  1:13 AM    Patient Admission Status: Inpatient   Readmission Risk (Low < 19, Mod (19-27), High > 27): Readmission Risk Score: 10    Current PCP: Jaquan Smith MD  PCP verified by CM? Yes    Chart Reviewed: Yes      History Provided by: Patient, Medical Record, Child/Family  Patient Orientation: Alert and Oriented    Patient Cognition: Alert    Hospitalization in the last 30 days (Readmission):  No    If yes, Readmission Assessment in  Navigator will be completed.    Advance Directives:      Code Status: Full Code   Patient's Primary Decision Maker is: Legal Next of Kin    Primary Decision Maker: Lacie Khan - Niece/Nephew - [phone]    Primary Decision Maker: Dorothy Palm - Niece/Nephew - [phone]    Discharge Planning:    Patient lives with: Alone Type of Home: House  Primary Care Giver: Self  Patient Support Systems include: Family Members   Current Financial resources: Medicare  Current community resources: None  Current services prior to admission: None            Current DME:              Type of Home Care services:  None    ADLS  Prior functional level: Independent in ADLs/IADLs  Current functional level: Assistance with the following:, Cooking, Housework, Mobility    PT AM-PAC:   /24  OT AM-PAC:   /24    Family can provide assistance at DC: No  Would you like Case Management to discuss the discharge plan with any other family members/significant others, and if so, who? Yes  Plans to Return to Present Housing: Unknown at present  Other  TRINIF Done OU---[facility]  Dr. Snyder

## 2025-01-10 ENCOUNTER — HOSPITAL ENCOUNTER (INPATIENT)
Age: 85
LOS: 8 days | Discharge: HOME HEALTH | DRG: 560 | End: 2025-01-18
Attending: PHYSICAL MEDICINE & REHABILITATION | Admitting: PHYSICAL MEDICINE & REHABILITATION
Payer: MEDICARE

## 2025-01-10 VITALS
BODY MASS INDEX: 23.63 KG/M2 | OXYGEN SATURATION: 94 % | SYSTOLIC BLOOD PRESSURE: 138 MMHG | WEIGHT: 120.37 LBS | TEMPERATURE: 98 F | RESPIRATION RATE: 17 BRPM | DIASTOLIC BLOOD PRESSURE: 64 MMHG | HEART RATE: 87 BPM | HEIGHT: 60 IN

## 2025-01-10 PROBLEM — S72.141A CLOSED DISPLACED INTERTROCHANTERIC FRACTURE OF RIGHT FEMUR, INITIAL ENCOUNTER (HCC): Status: ACTIVE | Noted: 2025-01-10

## 2025-01-10 LAB
ALBUMIN SERPL-MCNC: 3.5 G/DL (ref 3.4–5)
ALBUMIN/GLOB SERPL: 1.3 {RATIO} (ref 1.1–2.2)
ALP SERPL-CCNC: 55 U/L (ref 40–129)
ALT SERPL-CCNC: 21 U/L (ref 10–40)
ANION GAP SERPL CALCULATED.3IONS-SCNC: 11 MMOL/L (ref 3–16)
APTT BLD: 24.8 SEC (ref 22.1–36.4)
AST SERPL-CCNC: 46 U/L (ref 15–37)
BASOPHILS # BLD: 0.1 K/UL (ref 0–0.2)
BASOPHILS NFR BLD: 0.6 %
BILIRUB SERPL-MCNC: 0.4 MG/DL (ref 0–1)
BUN SERPL-MCNC: 31 MG/DL (ref 7–20)
CALCIUM SERPL-MCNC: 8.8 MG/DL (ref 8.3–10.6)
CHLORIDE SERPL-SCNC: 101 MMOL/L (ref 99–110)
CO2 SERPL-SCNC: 24 MMOL/L (ref 21–32)
CREAT SERPL-MCNC: 1.1 MG/DL (ref 0.6–1.2)
DEPRECATED RDW RBC AUTO: 13.4 % (ref 12.4–15.4)
EOSINOPHIL # BLD: 0 K/UL (ref 0–0.6)
EOSINOPHIL NFR BLD: 0 %
GFR SERPLBLD CREATININE-BSD FMLA CKD-EPI: 50 ML/MIN/{1.73_M2}
GLUCOSE SERPL-MCNC: 107 MG/DL (ref 70–99)
HCT VFR BLD AUTO: 36.2 % (ref 36–48)
HGB BLD-MCNC: 11.9 G/DL (ref 12–16)
INR PPP: 1.05 (ref 0.85–1.15)
LYMPHOCYTES # BLD: 1.1 K/UL (ref 1–5.1)
LYMPHOCYTES NFR BLD: 7.9 %
MCH RBC QN AUTO: 31.2 PG (ref 26–34)
MCHC RBC AUTO-ENTMCNC: 33 G/DL (ref 31–36)
MCV RBC AUTO: 94.5 FL (ref 80–100)
MONOCYTES # BLD: 1.5 K/UL (ref 0–1.3)
MONOCYTES NFR BLD: 11.3 %
NEUTROPHILS # BLD: 11 K/UL (ref 1.7–7.7)
NEUTROPHILS NFR BLD: 80.2 %
PLATELET # BLD AUTO: 176 K/UL (ref 135–450)
PMV BLD AUTO: 10 FL (ref 5–10.5)
POTASSIUM SERPL-SCNC: 4.4 MMOL/L (ref 3.5–5.1)
PROT SERPL-MCNC: 6.2 G/DL (ref 6.4–8.2)
PROTHROMBIN TIME: 13.9 SEC (ref 11.9–14.9)
RBC # BLD AUTO: 3.83 M/UL (ref 4–5.2)
SODIUM SERPL-SCNC: 136 MMOL/L (ref 136–145)
WBC # BLD AUTO: 13.7 K/UL (ref 4–11)

## 2025-01-10 PROCEDURE — 6370000000 HC RX 637 (ALT 250 FOR IP): Performed by: ORTHOPAEDIC SURGERY

## 2025-01-10 PROCEDURE — 97116 GAIT TRAINING THERAPY: CPT

## 2025-01-10 PROCEDURE — 6370000000 HC RX 637 (ALT 250 FOR IP): Performed by: FAMILY MEDICINE

## 2025-01-10 PROCEDURE — 97530 THERAPEUTIC ACTIVITIES: CPT

## 2025-01-10 PROCEDURE — 85025 COMPLETE CBC W/AUTO DIFF WBC: CPT

## 2025-01-10 PROCEDURE — 94760 N-INVAS EAR/PLS OXIMETRY 1: CPT

## 2025-01-10 PROCEDURE — 1280000000 HC REHAB R&B

## 2025-01-10 PROCEDURE — 85730 THROMBOPLASTIN TIME PARTIAL: CPT

## 2025-01-10 PROCEDURE — 6370000000 HC RX 637 (ALT 250 FOR IP): Performed by: PHYSICAL MEDICINE & REHABILITATION

## 2025-01-10 PROCEDURE — 6360000002 HC RX W HCPCS: Performed by: PHYSICAL MEDICINE & REHABILITATION

## 2025-01-10 PROCEDURE — 97166 OT EVAL MOD COMPLEX 45 MIN: CPT

## 2025-01-10 PROCEDURE — 85610 PROTHROMBIN TIME: CPT

## 2025-01-10 PROCEDURE — 2500000003 HC RX 250 WO HCPCS: Performed by: PHYSICAL MEDICINE & REHABILITATION

## 2025-01-10 PROCEDURE — 2500000003 HC RX 250 WO HCPCS: Performed by: ORTHOPAEDIC SURGERY

## 2025-01-10 PROCEDURE — 97535 SELF CARE MNGMENT TRAINING: CPT

## 2025-01-10 PROCEDURE — 97162 PT EVAL MOD COMPLEX 30 MIN: CPT

## 2025-01-10 PROCEDURE — 80053 COMPREHEN METABOLIC PANEL: CPT

## 2025-01-10 PROCEDURE — 36415 COLL VENOUS BLD VENIPUNCTURE: CPT

## 2025-01-10 PROCEDURE — 6360000002 HC RX W HCPCS: Performed by: ORTHOPAEDIC SURGERY

## 2025-01-10 RX ORDER — BISACODYL 10 MG
10 SUPPOSITORY, RECTAL RECTAL DAILY PRN
Status: CANCELLED | OUTPATIENT
Start: 2025-01-10

## 2025-01-10 RX ORDER — BRIMONIDINE TARTRATE 2 MG/ML
1 SOLUTION/ DROPS OPHTHALMIC 2 TIMES DAILY
Status: CANCELLED | OUTPATIENT
Start: 2025-01-10

## 2025-01-10 RX ORDER — ENOXAPARIN SODIUM 100 MG/ML
40 INJECTION SUBCUTANEOUS DAILY
Status: CANCELLED | OUTPATIENT
Start: 2025-01-10

## 2025-01-10 RX ORDER — SODIUM CHLORIDE 0.9 % (FLUSH) 0.9 %
5-40 SYRINGE (ML) INJECTION PRN
Status: DISCONTINUED | OUTPATIENT
Start: 2025-01-10 | End: 2025-01-18 | Stop reason: HOSPADM

## 2025-01-10 RX ORDER — TIMOLOL MALEATE 5 MG/ML
1 SOLUTION/ DROPS OPHTHALMIC 2 TIMES DAILY
Status: DISCONTINUED | OUTPATIENT
Start: 2025-01-10 | End: 2025-01-10 | Stop reason: HOSPADM

## 2025-01-10 RX ORDER — ASPIRIN 81 MG/1
81 TABLET ORAL 2 TIMES DAILY
Status: DISCONTINUED | OUTPATIENT
Start: 2025-01-10 | End: 2025-01-18 | Stop reason: HOSPADM

## 2025-01-10 RX ORDER — ASPIRIN 81 MG/1
81 TABLET ORAL 2 TIMES DAILY
Status: CANCELLED | OUTPATIENT
Start: 2025-01-10

## 2025-01-10 RX ORDER — TIMOLOL MALEATE 5 MG/ML
1 SOLUTION/ DROPS OPHTHALMIC 2 TIMES DAILY
Status: CANCELLED | OUTPATIENT
Start: 2025-01-10

## 2025-01-10 RX ORDER — VITAMIN B COMPLEX
2000 TABLET ORAL DAILY
Status: DISCONTINUED | OUTPATIENT
Start: 2025-01-11 | End: 2025-01-18 | Stop reason: HOSPADM

## 2025-01-10 RX ORDER — SENNA AND DOCUSATE SODIUM 50; 8.6 MG/1; MG/1
1 TABLET, FILM COATED ORAL 2 TIMES DAILY
Status: DISCONTINUED | OUTPATIENT
Start: 2025-01-10 | End: 2025-01-13

## 2025-01-10 RX ORDER — TIMOLOL MALEATE 5 MG/ML
1 SOLUTION/ DROPS OPHTHALMIC 2 TIMES DAILY
Status: DISCONTINUED | OUTPATIENT
Start: 2025-01-10 | End: 2025-01-18 | Stop reason: HOSPADM

## 2025-01-10 RX ORDER — POLYETHYLENE GLYCOL 3350 17 G/17G
17 POWDER, FOR SOLUTION ORAL DAILY PRN
Status: DISCONTINUED | OUTPATIENT
Start: 2025-01-10 | End: 2025-01-18 | Stop reason: HOSPADM

## 2025-01-10 RX ORDER — SODIUM CHLORIDE 0.9 % (FLUSH) 0.9 %
5-40 SYRINGE (ML) INJECTION EVERY 12 HOURS SCHEDULED
Status: CANCELLED | OUTPATIENT
Start: 2025-01-10

## 2025-01-10 RX ORDER — POLYETHYLENE GLYCOL 3350 17 G/17G
17 POWDER, FOR SOLUTION ORAL DAILY PRN
Status: CANCELLED | OUTPATIENT
Start: 2025-01-10

## 2025-01-10 RX ORDER — ATORVASTATIN CALCIUM 20 MG/1
20 TABLET, FILM COATED ORAL NIGHTLY
Status: CANCELLED | OUTPATIENT
Start: 2025-01-10

## 2025-01-10 RX ORDER — VITAMIN B COMPLEX
2000 TABLET ORAL DAILY
Status: DISCONTINUED | OUTPATIENT
Start: 2025-01-10 | End: 2025-01-10 | Stop reason: HOSPADM

## 2025-01-10 RX ORDER — TRAMADOL HYDROCHLORIDE 50 MG/1
50 TABLET ORAL EVERY 4 HOURS PRN
Status: CANCELLED | OUTPATIENT
Start: 2025-01-10

## 2025-01-10 RX ORDER — PROMETHAZINE HYDROCHLORIDE 25 MG/1
12.5 TABLET ORAL EVERY 6 HOURS PRN
Status: CANCELLED | OUTPATIENT
Start: 2025-01-10

## 2025-01-10 RX ORDER — ACETAMINOPHEN 325 MG/1
650 TABLET ORAL EVERY 6 HOURS PRN
Status: DISCONTINUED | OUTPATIENT
Start: 2025-01-10 | End: 2025-01-18 | Stop reason: HOSPADM

## 2025-01-10 RX ORDER — ACETAMINOPHEN 325 MG/1
650 TABLET ORAL EVERY 6 HOURS PRN
Status: CANCELLED | OUTPATIENT
Start: 2025-01-10

## 2025-01-10 RX ORDER — SODIUM CHLORIDE 9 MG/ML
INJECTION, SOLUTION INTRAVENOUS PRN
Status: DISCONTINUED | OUTPATIENT
Start: 2025-01-10 | End: 2025-01-18 | Stop reason: HOSPADM

## 2025-01-10 RX ORDER — LOSARTAN POTASSIUM 25 MG/1
25 TABLET ORAL 2 TIMES DAILY
Status: CANCELLED | OUTPATIENT
Start: 2025-01-10

## 2025-01-10 RX ORDER — SODIUM CHLORIDE 0.9 % (FLUSH) 0.9 %
5-40 SYRINGE (ML) INJECTION EVERY 12 HOURS SCHEDULED
Status: DISCONTINUED | OUTPATIENT
Start: 2025-01-10 | End: 2025-01-18

## 2025-01-10 RX ORDER — BISACODYL 10 MG
10 SUPPOSITORY, RECTAL RECTAL DAILY PRN
Status: DISCONTINUED | OUTPATIENT
Start: 2025-01-10 | End: 2025-01-18 | Stop reason: HOSPADM

## 2025-01-10 RX ORDER — MULTIVIT-MIN/IRON/FOLIC ACID/K 18-600-40
2000 CAPSULE ORAL DAILY
COMMUNITY

## 2025-01-10 RX ORDER — HYDRALAZINE HYDROCHLORIDE 10 MG/1
10 TABLET, FILM COATED ORAL EVERY 6 HOURS PRN
Status: DISCONTINUED | OUTPATIENT
Start: 2025-01-10 | End: 2025-01-18 | Stop reason: HOSPADM

## 2025-01-10 RX ORDER — SODIUM CHLORIDE 0.9 % (FLUSH) 0.9 %
5-40 SYRINGE (ML) INJECTION PRN
Status: CANCELLED | OUTPATIENT
Start: 2025-01-10

## 2025-01-10 RX ORDER — ONDANSETRON 2 MG/ML
4 INJECTION INTRAMUSCULAR; INTRAVENOUS EVERY 6 HOURS PRN
Status: DISCONTINUED | OUTPATIENT
Start: 2025-01-10 | End: 2025-01-18 | Stop reason: HOSPADM

## 2025-01-10 RX ORDER — ENOXAPARIN SODIUM 100 MG/ML
40 INJECTION SUBCUTANEOUS DAILY
Status: DISCONTINUED | OUTPATIENT
Start: 2025-01-10 | End: 2025-01-10

## 2025-01-10 RX ORDER — ATORVASTATIN CALCIUM 20 MG/1
20 TABLET, FILM COATED ORAL NIGHTLY
Status: DISCONTINUED | OUTPATIENT
Start: 2025-01-10 | End: 2025-01-18 | Stop reason: HOSPADM

## 2025-01-10 RX ORDER — ONDANSETRON 2 MG/ML
4 INJECTION INTRAMUSCULAR; INTRAVENOUS EVERY 6 HOURS PRN
Status: CANCELLED | OUTPATIENT
Start: 2025-01-10

## 2025-01-10 RX ORDER — ASPIRIN 81 MG/1
81 TABLET ORAL 2 TIMES DAILY
Qty: 60 TABLET | Refills: 0 | Status: ON HOLD | OUTPATIENT
Start: 2025-01-10 | End: 2025-01-17

## 2025-01-10 RX ORDER — ENOXAPARIN SODIUM 100 MG/ML
30 INJECTION SUBCUTANEOUS DAILY
Status: DISCONTINUED | OUTPATIENT
Start: 2025-01-10 | End: 2025-01-18 | Stop reason: HOSPADM

## 2025-01-10 RX ORDER — TRAMADOL HYDROCHLORIDE 50 MG/1
50 TABLET ORAL EVERY 4 HOURS PRN
Status: DISCONTINUED | OUTPATIENT
Start: 2025-01-10 | End: 2025-01-18 | Stop reason: HOSPADM

## 2025-01-10 RX ORDER — LOSARTAN POTASSIUM 25 MG/1
25 TABLET ORAL 2 TIMES DAILY
Status: DISCONTINUED | OUTPATIENT
Start: 2025-01-10 | End: 2025-01-11

## 2025-01-10 RX ORDER — SENNA AND DOCUSATE SODIUM 50; 8.6 MG/1; MG/1
1 TABLET, FILM COATED ORAL 2 TIMES DAILY
Status: CANCELLED | OUTPATIENT
Start: 2025-01-10

## 2025-01-10 RX ORDER — SODIUM CHLORIDE 9 MG/ML
INJECTION, SOLUTION INTRAVENOUS PRN
Status: CANCELLED | OUTPATIENT
Start: 2025-01-10

## 2025-01-10 RX ORDER — BRIMONIDINE TARTRATE 2 MG/ML
1 SOLUTION/ DROPS OPHTHALMIC 2 TIMES DAILY
Status: DISCONTINUED | OUTPATIENT
Start: 2025-01-10 | End: 2025-01-18 | Stop reason: HOSPADM

## 2025-01-10 RX ORDER — HYDRALAZINE HYDROCHLORIDE 10 MG/1
10 TABLET, FILM COATED ORAL EVERY 6 HOURS PRN
Status: CANCELLED | OUTPATIENT
Start: 2025-01-10

## 2025-01-10 RX ORDER — OXYCODONE HYDROCHLORIDE 5 MG/1
5 TABLET ORAL EVERY 6 HOURS PRN
Qty: 20 TABLET | Refills: 0 | Status: ON HOLD | OUTPATIENT
Start: 2025-01-10 | End: 2025-01-17 | Stop reason: HOSPADM

## 2025-01-10 RX ORDER — VITAMIN B COMPLEX
2000 TABLET ORAL DAILY
Status: CANCELLED | OUTPATIENT
Start: 2025-01-11

## 2025-01-10 RX ORDER — BRIMONIDINE TARTRATE 2 MG/ML
1 SOLUTION/ DROPS OPHTHALMIC 2 TIMES DAILY
Status: DISCONTINUED | OUTPATIENT
Start: 2025-01-10 | End: 2025-01-10 | Stop reason: HOSPADM

## 2025-01-10 RX ORDER — PROMETHAZINE HYDROCHLORIDE 25 MG/1
12.5 TABLET ORAL EVERY 6 HOURS PRN
Status: DISCONTINUED | OUTPATIENT
Start: 2025-01-10 | End: 2025-01-18 | Stop reason: HOSPADM

## 2025-01-10 RX ADMIN — BRIMONIDINE TARTRATE 1 DROP: 2 SOLUTION OPHTHALMIC at 21:14

## 2025-01-10 RX ADMIN — SODIUM CHLORIDE, PRESERVATIVE FREE 10 ML: 5 INJECTION INTRAVENOUS at 21:14

## 2025-01-10 RX ADMIN — ASPIRIN 81 MG: 81 TABLET, COATED ORAL at 21:13

## 2025-01-10 RX ADMIN — SENNOSIDES AND DOCUSATE SODIUM 1 TABLET: 50; 8.6 TABLET ORAL at 09:32

## 2025-01-10 RX ADMIN — WATER 2000 MG: 1 INJECTION INTRAMUSCULAR; INTRAVENOUS; SUBCUTANEOUS at 04:47

## 2025-01-10 RX ADMIN — ACETAMINOPHEN 650 MG: 325 TABLET ORAL at 15:48

## 2025-01-10 RX ADMIN — ACETAMINOPHEN 325MG 650 MG: 325 TABLET ORAL at 09:32

## 2025-01-10 RX ADMIN — LOSARTAN POTASSIUM 25 MG: 25 TABLET, FILM COATED ORAL at 21:13

## 2025-01-10 RX ADMIN — TIMOLOL MALEATE 1 DROP: 5 SOLUTION OPHTHALMIC at 21:14

## 2025-01-10 RX ADMIN — ACETAMINOPHEN 650 MG: 325 TABLET ORAL at 22:12

## 2025-01-10 RX ADMIN — TIMOLOL MALEATE 1 DROP: 5 SOLUTION OPHTHALMIC at 12:27

## 2025-01-10 RX ADMIN — BRIMONIDINE TARTRATE 1 DROP: 2 SOLUTION OPHTHALMIC at 12:27

## 2025-01-10 RX ADMIN — ATORVASTATIN CALCIUM 20 MG: 20 TABLET, FILM COATED ORAL at 21:14

## 2025-01-10 RX ADMIN — ONDANSETRON 4 MG: 2 INJECTION, SOLUTION INTRAMUSCULAR; INTRAVENOUS at 00:11

## 2025-01-10 RX ADMIN — ASPIRIN 81 MG: 81 TABLET, COATED ORAL at 09:32

## 2025-01-10 RX ADMIN — SENNOSIDES AND DOCUSATE SODIUM 1 TABLET: 50; 8.6 TABLET ORAL at 15:49

## 2025-01-10 RX ADMIN — SENNOSIDES AND DOCUSATE SODIUM 1 TABLET: 50; 8.6 TABLET ORAL at 21:13

## 2025-01-10 RX ADMIN — ACETAMINOPHEN 325MG 650 MG: 325 TABLET ORAL at 04:50

## 2025-01-10 RX ADMIN — Medication 2000 UNITS: at 12:27

## 2025-01-10 RX ADMIN — ENOXAPARIN SODIUM 30 MG: 100 INJECTION SUBCUTANEOUS at 15:48

## 2025-01-10 ASSESSMENT — PAIN - FUNCTIONAL ASSESSMENT: PAIN_FUNCTIONAL_ASSESSMENT: ACTIVITIES ARE NOT PREVENTED

## 2025-01-10 ASSESSMENT — PAIN DESCRIPTION - ORIENTATION
ORIENTATION: RIGHT
ORIENTATION: RIGHT

## 2025-01-10 ASSESSMENT — PAIN SCALES - GENERAL
PAINLEVEL_OUTOF10: 7
PAINLEVEL_OUTOF10: 6
PAINLEVEL_OUTOF10: 3

## 2025-01-10 ASSESSMENT — PAIN DESCRIPTION - PAIN TYPE: TYPE: ACUTE PAIN;SURGICAL PAIN

## 2025-01-10 ASSESSMENT — PAIN DESCRIPTION - LOCATION
LOCATION: BACK;HIP
LOCATION: HIP

## 2025-01-10 ASSESSMENT — PAIN DESCRIPTION - ONSET: ONSET: GRADUAL

## 2025-01-10 ASSESSMENT — PAIN DESCRIPTION - DESCRIPTORS
DESCRIPTORS: ACHING;DISCOMFORT
DESCRIPTORS: ACHING

## 2025-01-10 ASSESSMENT — PAIN DESCRIPTION - FREQUENCY: FREQUENCY: INTERMITTENT

## 2025-01-10 NOTE — PLAN OF CARE
Problem: Discharge Planning  Goal: Discharge to home or other facility with appropriate resources  Outcome: Progressing     Problem: Safety - Adult  Goal: Free from fall injury  Outcome: Progressing     Problem: Skin/Tissue Integrity  Goal: Absence of new skin breakdown  Description: 1.  Monitor for areas of redness and/or skin breakdown  2.  Assess vascular access sites hourly  3.  Every 4-6 hours minimum:  Change oxygen saturation probe site  4.  Every 4-6 hours:  If on nasal continuous positive airway pressure, respiratory therapy assess nares and determine need for appliance change or resting period.  Outcome: Progressing     Problem: ABCDS Injury Assessment  Goal: Absence of physical injury  Outcome: Progressing     Problem: Musculoskeletal - Adult  Goal: Return mobility to safest level of function  Outcome: Progressing

## 2025-01-10 NOTE — PROGRESS NOTES
[facility] Orthopedic Surgery   Progress Note      S/P :  SUBJECTIVE  in recliner. Alert and oriented. . Pain is   described in right hip and with the intensity of moderate. Pain is described as aching.       OBJECTIVE              Physical                      VITALS:  /63   Pulse 76   Temp 98 °F (36.7 °C) (Oral)   Resp 17   Ht 1.524 m (5')   Wt 54.6 kg (120 lb 5.9 oz)   SpO2 93%   BMI 23.51 kg/m²                     MUSCULOSKELETAL:  right foot NVI. Wiggles toes to command. Able to plantarflex and dorsiflex ankle Pedal pulses are palpable.                    NEUROLOGIC:                                  Sensory:  Touch:  Right Lower Extremity:  normal                                                 Surgical wound appears clean and dry right hip with Prineo dressing. Ice pack on.     Data       CBC:   Lab Results   Component Value Date/Time    WBC 13.7 01/10/2025 05:16 AM    RBC 3.83 01/10/2025 05:16 AM    HGB 11.9 01/10/2025 05:16 AM    HCT 36.2 01/10/2025 05:16 AM    MCV 94.5 01/10/2025 05:16 AM    MCH 31.2 01/10/2025 05:16 AM    MCHC 33.0 01/10/2025 05:16 AM    RDW 13.4 01/10/2025 05:16 AM     01/10/2025 05:16 AM    MPV 10.0 01/10/2025 05:16 AM        WBC:    Lab Results   Component Value Date/Time    WBC 13.7 01/10/2025 05:16 AM        Hemoglobin/Hematocrit:    Lab Results   Component Value Date/Time    HGB 11.9 01/10/2025 05:16 AM    HCT 36.2 01/10/2025 05:16 AM        PT/INR:    Lab Results   Component Value Date/Time    PROTIME 13.9 01/10/2025 05:16 AM    INR 1.05 01/10/2025 05:16 AM              Current Inpatient Medications             Current Facility-Administered Medications: Vitamin D (CHOLECALCIFEROL) tablet 2,000 Units, 2,000 Units, Oral, Daily  brimonidine (ALPHAGAN) 0.2 % ophthalmic solution 1 drop, 1 drop, Both Eyes, BID **AND** timolol (TIMOPTIC) 0.5 % ophthalmic solution 1 drop, 1 drop, Both Eyes, BID  losartan (COZAAR) tablet 25 mg, 25 mg, Oral, BID  atorvastatin (LIPITOR) tablet  20 mg, 20 mg, Oral, Nightly  sodium chloride flush 0.9 % injection 5-40 mL, 5-40 mL, IntraVENous, 2 times per day  0.9 % sodium chloride infusion, , IntraVENous, PRN  potassium chloride (KLOR-CON M) extended release tablet 40 mEq, 40 mEq, Oral, PRN **OR** potassium bicarb-citric acid (EFFER-K) effervescent tablet 40 mEq, 40 mEq, Oral, PRN **OR** potassium chloride 10 mEq/100 mL IVPB (Peripheral Line), 10 mEq, IntraVENous, PRN  magnesium sulfate 2000 mg in 50 mL IVPB premix, 2,000 mg, IntraVENous, PRN  polyethylene glycol (GLYCOLAX) packet 17 g, 17 g, Oral, Daily PRN  acetaminophen (TYLENOL) tablet 650 mg, 650 mg, Oral, Q6H PRN **OR** acetaminophen (TYLENOL) suppository 650 mg, 650 mg, Rectal, Q6H PRN  0.9 % sodium chloride infusion, , IntraVENous, Continuous  sodium chloride flush 0.9 % injection 5-40 mL, 5-40 mL, IntraVENous, 2 times per day  sodium chloride flush 0.9 % injection 5-40 mL, 5-40 mL, IntraVENous, PRN  0.9 % sodium chloride infusion, , IntraVENous, PRN  acetaminophen (TYLENOL) tablet 650 mg, 650 mg, Oral, Q6H  oxyCODONE (ROXICODONE) immediate release tablet 5 mg, 5 mg, Oral, Q4H PRN **OR** oxyCODONE HCl (OXY-IR) immediate release tablet 10 mg, 10 mg, Oral, Q4H PRN  HYDROmorphone (DILAUDID) injection 0.25 mg, 0.25 mg, IntraVENous, Q3H PRN **OR** HYDROmorphone (DILAUDID) injection 0.5 mg, 0.5 mg, IntraVENous, Q3H PRN  sennosides-docusate sodium (SENOKOT-S) 8.6-50 MG tablet 1 tablet, 1 tablet, Oral, BID  promethazine (PHENERGAN) tablet 12.5 mg, 12.5 mg, Oral, Q6H PRN **OR** ondansetron (ZOFRAN) injection 4 mg, 4 mg, IntraVENous, Q6H PRN  aspirin EC tablet 81 mg, 81 mg, Oral, BID    ASSESSMENT AND PLAN    Post right hip hemiarthroplasty per Dr Jiang, Stable exam  DVT prophylaxis ordered,  ASA 81mg twice at day for 30 days for DVT prophylaxis   PT OT for ADL's and ambulation as tolerated, WBAT, posterior hip precautions  SS for DC planning, ECF/rehab planned.   IV or PO pain med as ordered   Followup

## 2025-01-10 NOTE — CONSULTS
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 She fell approximately 12 hours before coming into the ER.  States that she got her slipper caught on loose dustin resulting in fall. Denies head trauma or loss of consciousness.  Initially able to ambulate but then hip became progressively more painful and she was unable to bear weight on it.  Diagnostic work-up revealed displaced right femoral neck fracture. Patient underwent right hemiarthroplasty (1/9 with Dr. Jiang). Now WBAT RLE with posterlateral hip precautions. Course complicated by leukocytosis, anemia, labile blood pressure. Currently, patient reports mild-moderate right hip pain. Worse with movement and weight bearing. Improves with rest and acetaminophen. She also has more mild right shoulder pain. She denies any sensory changes. She is interested in coming to ARU to improve her function prior to returning home.     Prior Level of Function:  Independent for mobility, ADLs, and IADLs  Continues to work 12 hours a week at the [employer] and is an active     Current Level of Function:  Functional transfers and ambulation with CGA with RW  Anticipate up to max assist for full ADL    Pertinent Social History:  Support: lives alone  Home set-up: multi level [place] but she doesn't use the second level; 3 MISTI with left handrail    Past Medical History:   Diagnosis Date    Vasculitis (HCC) 2014       Past Surgical History:   Procedure Laterality Date    HIP SURGERY Right 1/9/2025    RIGHT HIP HEMIARTHROPLASTY performed by [facility]y Jiang MD at [facility] OR       Family History   Problem Relation Age of Onset    Stroke Mother     Heart Attack  -- -- -- 99 20 90 % --   01/09/25 1307 127/62 98.7 °F (37.1 °C) Temporal (!) 112 17 (!) 88 % --   01/09/25 1029 136/73 99.9 °F (37.7 °C) Oral 81 18 92 % --     Const: Alert. WDWN. No distress  Eyes: Conjunctiva noninjected, no icterus noted; pupils equal   HENT: Atraumatic, normocephalic; Oral mucosa moist  Neck: Trachea midline, neck supple. No thyromegaly noted.  CV: Regular rate and rhythm, no murmur rub or gallop noted  Resp: Lungs clear to auscultation bilaterally, no rales wheezes or rhonchi, no retractions. Respirations unlabored.   GI: Soft, nontender, nondistended. Normal bowel sounds. No palpable masses.   Skin: Normal temperature and turgor. No rashes or breakdown noted on exposed areas.   Ext: Post-op swelling right hip. No significant edema appreciated in distal BLE. No varicosities.  MSK: Post op swelling right hip. Decreased right hip ROM. No erythema, warmth noted.   Neuro: Alert, oriented, appropriate. No cranial nerve deficits appreciated. Sensation intact to light touch. Motor examination reveals strength 5/5 in BUE, Righ hip limited by pain/precautions but 5/5 in distal RLE, 5/5 LLE except 4/5 left hip flexion.   Psych: Stable mood, normal judgement, normal affect     Lab Results   Component Value Date    WBC 13.7 (H) 01/10/2025    HGB 11.9 (L) 01/10/2025    HCT 36.2 01/10/2025    MCV 94.5 01/10/2025     01/10/2025     Lab Results   Component Value Date    INR 1.05 01/10/2025    INR 1.01 01/09/2025    PROTIME 13.9 01/10/2025    PROTIME 13.5 01/09/2025     Lab Results   Component Value Date    CREATININE 1.1 01/10/2025    BUN 31 (H) 01/10/2025     01/10/2025    K 4.4 01/10/2025     01/10/2025    CO2 24 01/10/2025     Lab Results   Component Value Date    ALT 21 01/10/2025    AST 46 (H) 01/10/2025    ALKPHOS 55 01/10/2025    BILITOT 0.4 01/10/2025         Most recent imaging studies revealed:    XR SHOULDER RIGHT (MIN 2 VIEWS)  Result Date: 1/9/2025  1.  Limited positioning of

## 2025-01-10 NOTE — PROGRESS NOTES
Patient admitted to rehab with closed fracture of R hip.  A/Ox4. Transfers with Stedy x1, gaitbelt. Mobility restrictions: posterolateral approach hip precautions. On Regular diet, tolerating well. Medications taken whole with water. On lovenox for DVT prophylaxis.  Skin: Surgical incision to R hip, closed with surgical glue. Well approximated. Bruising to site. Oxygen: n/a. LDA: R AC and L FA PIVs. Has been continent of bowel and of bladder. LBM 01.08.2025 per patient. Chair/bed alarms in use and call light in reach. Will monitor for safety.     Electronically signed by Cheryle Hill RN on 1/10/2025 at 2:36 PM

## 2025-01-10 NOTE — PROGRESS NOTES
ADMISSION ORIENTATION    Merari Cummins  MEDICAL RECORD NUMBER:  [id number]  AGE: 84 y.o.   GENDER: female  : 1940  TODAYS DATE:  1/10/2025      Room Orientation     Patient admitted to room 3256 per [] Wheel Chair  [] Bed  [x] Recliner  [] Stretcher  [] Other: n/a.     Transferred to:  [x] Bed  [] Recliner  [] Other: n/a.     Patient Required minimum assistance with Laurita Roay x1 and gaitbelt.     Patient was oriented to:  [x] Call Light  [x] Room Phone  [x] TV  [x] Thermostat  [x] Bathroom  [x] Bed and Chair Alarms per Rehab Policy  [x] Closet  [x] White Board and it's Use on Rehab  [] Other:    Patient Verbalized Understanding: Yes  Family Present: No      Rehab Orientation     [x] 3 Hours of Therapy  through   [x] Focus and Specialty of Physical, Occupational, and Speech and Language Pathology  [x] Weekly Team Conference and Discharge Planning  [x] Roles of the Rehab Doctor, Consulting Doctors, Nursing, Dietary, and Social Work  [x] Everyday Clothing, including proper footwear, for Therapy  [x] Visiting Hours, Visitor Ages, and Visitors Sleeping Overnight in the Hospital  [x] Visitor Participation in Therapy  [x]  and Sundays on Rehab  [x] Introduction of Welcome Folder, including Rehab Expectations, Nurse Manager's Welcome Letter, Visitor's Guide, and Menu Service  [x] Planning Ahead and Ordering Meals  [x] Falls Contract [x] Signed, [x] Copied, and [] Taped to Closet Door  [] Other:    Patient Verbalized Understanding: Yes  Family Present: No    Electronically signed by Cheryle Hill RN on 1/10/2025 at 2:08 PM

## 2025-01-10 NOTE — PROGRESS NOTES
Occupational Therapy  Facility/Department: [address] ORTHOPEDICS  Occupational Therapy Initial Assessment    Name: Merari Cummins  : 1940  MRN: [id number]  Date of Service: 1/10/2025    Discharge Recommendations:  Patient would benefit from continued therapy after discharge, 5-7 sessions per week  OT Equipment Recommendations  Other: cont to assess...     Merari Cummins scored a 17/24 on the AM-PAC ADL Inpatient form. Current research shows that an AM-PAC score of 17 or less is typically not associated with a discharge to the patient's home setting. Based on the patient's AM-PAC score and their current ADL deficits, it is recommended that the patient have 5-7 sessions per week of Occupational Therapy at d/c to increase the patient's independence.  At this time, this patient demonstrates complex nursing, medical, and rehabilitative needs, and would benefit from intensive rehabilitation services upon discharge from the Inpatient setting.  This patient demonstrates the ability to participate in and benefit from an intensive therapy program with a coordinated interdisciplinary team approach to foster frequent, structured, and documented communication among disciplines, who will work together to establish, prioritize, and achieve treatment goals. Please see assessment section for further patient specific details.    If patient discharges prior to next session this note will serve as a discharge summary.  Please see below for the latest assessment towards goals.      Patient Diagnosis(es): intertrochanteric fx of R femur  Past Medical History:  has a past medical history of Vasculitis (HCC).  Past Surgical History:  has a past surgical history that includes hip surgery (Right, 2025).    Treatment Diagnosis: impaired strength, balance, activity tolerance, ADL/IADLs, mobility/txs      Assessment  Performance deficits / Impairments: Decreased functional mobility ;Decreased endurance;Decreased ADL status;Decreased

## 2025-01-10 NOTE — DISCHARGE INSTR - COC
[facility] Continuity of Care Form    Patient Name:  Merari Cummins  : 1940    MRN:  [id number]    Admit date:  2025  Discharge date:  ***    Code Status Order: Full Code  Advance Directives: {YES OR NO:}    Admitting Physician: Lynette Murray MD  PCP: Jaquan Smtih MD    Discharging Nurse: ***  Discharging Hospital Unit/Room#: T6I-9979/3129-01  Discharging Unit Phone Number: ***    Emergency Contact:        Past Surgical History:  Past Surgical History:   Procedure Laterality Date    HIP SURGERY Right 2025    RIGHT HIP HEMIARTHROPLASTY performed by Ramy Jiang MD at [facility] OR       Immunization History:   Immunization History   Administered Date(s) Administered    COVID-19, MODERNA BLUE border, Primary or Immunocompromised, (age 12y+), IM, 100 mcg/0.5mL 2021, 2021    COVID-19, MODERNA PURPLE border, (age 18y+ booster, 6y-11y series), IM, 50 mcg/0.5 mL 10/30/2021, 2022, 2023    COVID-19, PFIZER Bivalent, DO NOT Dilute, (age 12y+), IM, 30 mcg/0.3 mL 10/19/2022    COVID-19, PFIZER, (age 12y+), IM, 30mcg/0.3mL 2024       Active Problems:  Principal Problem:    Intertrochanteric fracture of right femur, closed, initial encounter ([facility])  Active Problems:    Closed fracture of right hip (HCC)  Resolved Problems:    * No resolved hospital problems. *      Isolation/Infection:       Nurse Assessment:  Last Vital Signs:/63   Pulse 76   Temp 98 °F (36.7 °C) (Oral)   Resp 17   Ht 1.524 m (5')   Wt 54.6 kg (120 lb 5.9 oz)   SpO2 93%   BMI 23.51 kg/m²   Last documented pain score (0-10 scale): Pain Level: 3  Last Weight:   Wt Readings from Last 1 Encounters:   01/10/25 54.6 kg (120 lb 5.9 oz)     Mental Status:  {IP PT MENTAL STATUS:}     IV Access:  {MH MEGHAN IV ACCESS:545993352}    Nursing Mobility/ADLs:  Walking   {P DME ADLs:042531998}  Transfer  {[facility] DME ADLs:256742344}  Bathing  {P DME ADLs:239815319}  Dressing  {P DME  application of ZO hose with pt.     Patient's personal belongings (please select all that are sent with patient):  {CHP DME Belongings:662407528}    RN SIGNATURE:  {Esignature:[id number]}    PHYSICIAN SECTION    Prognosis: Good    Condition at Discharge: Stable    Rehab Potential (if transferring to Rehab): Good    Physician Certification: I certify the above orders, information, and transfer of Merari Cummins is necessary for the continuing treatment of the diagnosis listed and that he requires Skilled Nursing Facility for less 30 days.     Update Admission H&P: No change in H&P    PHYSICIAN SIGNATURE:  Electronically signed by DARYC Rowe CNP on 1/10/25 at 10:18 AM EST/ Dr Deidre Bryan MD 1/10/2025, 11:57 AM    CASE MANAGEMENT/SOCIAL WORK SECTION    Inpatient Status Date: ***    isinger Readmission Risk Assessment Score:    Discharging to Facility/ Agency   Name:   Address:  Phone:  Fax:    Dialysis Facility (if applicable)   Name:  Address:  Dialysis Schedule:  Phone:  Fax:    / signature: {Esignature:[id number]}    Followup Dr Jiang in office 2 weeks post hip surgery  [facility], [address], [address]   [id number],   [phone]         DISCHARGE INSTRUCTIONS FOR TOTAL JOINT REPLACEMENT  Activity:  Wear knee hi ZO hose daily and remove at night for 2 weeks post surgery  Elevate your leg if swelling occurs in your ankle.    Continue the exercise program as prescribed by physical therapists.  Take frequent walks.  Use walker, crutches, or cane with weight bearing instructions as indicated by the physical therapists.  Take rest periods often.   Incentive spirometer 4 times a day for 10 breaths for 1 weeks post surgery  Wear BIPAP/CPAP at all times when sleeping or napping if you have sleep apnea AND have a BIPAP/CPAP at home  Do not drive until cleared by your surgeon.    Wound Care:  Check the incision every day for drainage

## 2025-01-10 NOTE — PROGRESS NOTES
Physical Therapy    Facility/Department: [address] ORTHOPEDICS    Physical Therapy Initial Assessment      Name: Merari Cummins    : 1940    MRN: [id number]    Date of Service: 1/10/2025    Assessment / Discharge Recommendations:    -right hip fracture ORIF by hemiarthroplasty  wbat  and posterior precautions     -good start mobilizing from bed to walker to initiate ambulation in room   -recommend continued PT OT and nursing care in a skilled setting  -able to tolerate and benefit from a HIGH frequency of sessions for goal to return home independently         Patient Diagnosis(es):   Past Medical History:  has a past medical history of Vasculitis (HCC).  Past Surgical History:  has a past surgical history that includes hip surgery (Right, 2025).      Body Structures, Functions, Activity Limitations Requiring Skilled Therapeutic Intervention: Decreased functional mobility ;Decreased strength;Increased pain;Decreased ADL status;Decreased ROM;Decreased balance  Therapy Prognosis: Good  Decision Making: Medium Complexity  Requires PT Follow-Up: Yes  Activity Tolerance  Activity Tolerance: Patient tolerated treatment well    Plan  Physical Therapy Plan  Current Treatment Recommendations: Strengthening, Functional mobility training, Transfer training, ADL/Self-care training, Gait training, Positioning, Modalities, Therapeutic activities, Patient/Caregiver education & training  Additional Comments: 3-5 while on acute floor  Safety Devices  Type of Devices: All fall risk precautions in place, Call light within reach, Chair alarm in place, Left in chair, Nurse notified    Restrictions  Restrictions/Precautions  Restrictions/Precautions: Fall Risk, Weight Bearing  Lower Extremity Weight Bearing Restrictions  Right Lower Extremity Weight Bearing: Weight Bearing As Tolerated  Position Activity Restriction  Hip Precautions: Posterior hip precautions  Other Position/Activity Restrictions: No adduction, flexion beyond 90  limbs     Bed mobility  Supine to Sit: Contact guard assistance  Sit to Supine: Unable to assess  Scooting: Stand by assistance  Transfers  Sit to Stand: Contact guard assistance (cues for hand placements)  Stand to Sit: Contact guard assistance  Ambulation  Surface: Level tile  Device: Rolling Walker  Assistance: Contact guard assistance  Quality of Gait: step to pattern with fair heel contact and fair weight bearing   - very short step length for comfort  Distance: ~10 feet bed to toilet  Comments: overall stable  More Ambulation?: No  Stairs/Curb  Stairs?: No     Balance  Comments: midline in sitting at the EOB and in static stance on the walker   -dynamic is fair using rolling walker at close cga     -cold pack to right posteriolateral hip PRN   -ankle pumps at 30 each hour in slow pace     AM-[facility] Basic Mobility - Inpatient   How much help is needed turning from your back to your side while in a flat bed without using bedrails?: A Lot  How much help is needed moving from lying on your back to sitting on the side of a flat bed without using bedrails?: A Little  How much help is needed moving to and from a bed to a chair?: A Little  How much help is needed standing up from a chair using your arms?: A Little  How much help is needed walking in hospital room?: A Little  How much help is needed climbing 3-5 steps with a railing?: Total  AM-PAC Inpatient Mobility Raw Score : 15  AM-PAC Inpatient T-Scale Score : 39.45  Mobility Inpatient CMS 0-100% Score: 57.7  Mobility Inpatient CMS G-Code Modifier : CK    Goals  Short Term Goals  Time Frame for Short Term Goals: 1-2 days  Short Term Goal 1: bed mobility at [facility]  Short Term Goal 2: transfers at [facility]  Short Term Goal 3: ambulation at [facility] wbat rolling walker for 25 feet  Short Term Goal 4: exercises at supervision  Short Term Goal 5: verbalize and demonstrate posterior precautions  Patient Goals   Patient Goals : get well and back home and to her usual activity

## 2025-01-10 NOTE — PROGRESS NOTES
Ethnicity  \"Are you of , /a, or Citizen of [place] origin?\"  Check all that apply:  [x] A.  No, not of , /a, or Citizen of [place] Origin  [] B.  Yes, Mauritanian, Mauritanian American, Chicano/a  [] C.  Yes, Bruneian  [] D.  Yes, Edwin  [] E.  Yes, another , , or Citizen of [place] origin  [] X.  Patient unable to respond    Race  \"What is your race?\"  Check all that apply:  [x] A.  White  [] B.  Black or   [] C.   or   [] D.     [] E.  Chinese  [] F.  Niuean  [] G. Croatian  [] H.  Hungarian  [] I.  Syrian  [] J.  Other   [] K.    [] L.  Zambian or Kody  [] M.  Nauruan  [] N.  Other   [] X.  Patient unable to respond    High Risk Drug Classes:  Use and Indication    Is taking: Check if the pt is taking any medications by pharmacological classification, not how it is used, in the following classes  Indication noted: If column 1 is checked, check if there is an indication noted for all meds in the drug class Is taking  (check all that apply) Indication noted (check all that apply)   Antipsychotic [] []   Anticoagulant [x] []   Antibiotic [] []   Opioid [x] []   Antiplatelet [x] []   Hypoglycemic (including insulin) [] []   None of the above []     Special Treatments, Procedures, and Programs    Check all of the following treatments, procedures, and programs that apply on admission. On admission (check all that apply)   Cancer Treatments   A1. Chemotherapy []           A2. IV []           A3. Oral []           A10. Other []   B1. Radiation []   Respiratory Therapies   C1. Oxygen Therapy []           C2. Continuous []           C3. Intermittent []           C4. High-concentration []   D1. Suctioning []           D2. Scheduled []           D3. As needed []   E1. Tracheostomy Care []   F1. Invasive Mechanical Ventilator (ventilator or respirator) []   G1. Non-invasive Mechanical Ventilator []           G2. BiPAP []            G3. CPAP []   Other   H1. IV Medications []           H2. Vasoactive medications []           H3. Antibiotics []           H4. Anticoagulation []           H10. Other []   I1. Transfusions []   J1. Dialysis []           J2. Hemodialysis []           J3. Peritoneal dialysis []   O1. IV access []           O2. Peripheral [x]           O3. Midline []           O4. Central (PICC, tunneled, port) []      None of the above (select if no Cancer, Respiratory, or Other boxes are checked) []

## 2025-01-10 NOTE — PLAN OF CARE
Problem: Discharge Planning  Goal: Discharge to home or other facility with appropriate resources  1/9/2025 1952 by Diana Sanchez, RN  Outcome: Progressing  Flowsheets (Taken 1/9/2025 0916 by Selina Call, RN)  Discharge to home or other facility with appropriate resources:   Identify barriers to discharge with patient and caregiver   Arrange for needed discharge resources and transportation as appropriate   Identify discharge learning needs (meds, wound care, etc)   Refer to discharge planning if patient needs post-hospital services based on physician order or complex needs related to functional status, cognitive ability or social support system  1/9/2025 0916 by Selina Call, RN  Flowsheets (Taken 1/9/2025 0916)  Discharge to home or other facility with appropriate resources:   Identify barriers to discharge with patient and caregiver   Arrange for needed discharge resources and transportation as appropriate   Identify discharge learning needs (meds, wound care, etc)   Refer to discharge planning if patient needs post-hospital services based on physician order or complex needs related to functional status, cognitive ability or social support system     Problem: Pain  Goal: Verbalizes/displays adequate comfort level or baseline comfort level  1/9/2025 1952 by Diana Sanchez, RN  Outcome: Progressing  Flowsheets (Taken 1/9/2025 0916 by Selina Call, RN)  Verbalizes/displays adequate comfort level or baseline comfort level:   Encourage patient to monitor pain and request assistance   Assess pain using appropriate pain scale   Administer analgesics based on type and severity of pain and evaluate response   Implement non-pharmacological measures as appropriate and evaluate response   Consider cultural and social influences on pain and pain management   Notify Licensed Independent Practitioner if interventions unsuccessful or patient reports new pain  1/9/2025 0916 by Selina Call,

## 2025-01-10 NOTE — PROGRESS NOTES
V2.0    [facility] Progress Note      Name:  Merari Cummins /Age/Sex: 1940  (84 y.o. female)   MRN & CSN:  [id number] & [id number] Encounter Date/Time: 1/10/2025 11:25 AM EST   Location:  E1C-1619/3129-01 PCP: Jaquan Smith MD     Attending:Moira Bryan MD       Hospital Day: 2    Assessment and Recommendations   Merari Cummins is a 84 y.o. female with pmh of vasculitis, HTN, HLD, depression who presents with Intertrochanteric fracture of right femur, closed, initial encounter ([facility])    Patient was examined this morning.  Feeling well.    Patient underwent right hip hemiarthroplasty yesterday, when 2025 and tolerated well.  Patient evaluated by physical therapy recommended that patient goes to inpatient rehab.  Patient is excited and agreement to the plan.  A consult for made for an inpatient rehab.          Plan:   Intertrochanter fracture of the right femur if  Due to mechanical fall  Orthopedic surgery on board  Pain control  PT OT to evaluate the patient after the surgery  Possible inpatient rehab    2.  Hypertension  Losartan 25 mg twice daily  Will continue to monitor vitals for further management if needed    3.  HLD  Atorvastatin 20 mg daily  .  Continue to follow monitor and manage chronic medical conditions with medication listed below    Diet ADULT DIET; Regular   DVT Prophylaxis [] Lovenox, []  Heparin, [] SCDs, [] Ambulation,  [] Eliquis, [] Xarelto  [] Coumadin   Code Status Full Code   Disposition From: Home  Expected Disposition: SNF  Estimated Date of Discharge: 1-2 days  Patient requires continued admission due to upon surgical correction   Surrogate Decision Maker/ POA Self area     Personally reviewed Lab Studies and Imaging         Medical Decision Making:  The following items were considered in medical decision making:  Discussion of patient care with other providers  Reviewed clinical lab tests  Reviewed radiology tests  Reviewed other diagnostic tests/interventions  Independent review

## 2025-01-10 NOTE — PROGRESS NOTES
4 Eyes Skin Assessment     NAME:  Merari Cummins  YOB: 1940  MEDICAL RECORD NUMBER:  [id number]    The patient is being assessed for  Admission    I agree that at least one RN has performed a thorough Head to Toe Skin Assessment on the patient. ALL assessment sites listed below have been assessed.      Areas assessed by both nurses:    Head, Face, Ears, Shoulders, Back, Chest, Arms, Elbows, Hands, Sacrum. Buttock, Coccyx, Ischium, and Legs. Feet and Heels        Does the Patient have a Wound? No noted wound(s). Surgical incision noted.        Salo Prevention initiated by RN: Yes  Wound Care Orders initiated by RN: Yes    Pressure Injury (Stage 3,4, Unstageable, DTI, NWPT, and Complex wounds) if present, place Wound referral order by RN under : No    New Ostomies, if present place, Ostomy referral order under : No     Nurse 1 eSignature: Electronically signed by Cheryle Hill RN on 1/10/25 at 2:07 PM EST    **SHARE this note so that the co-signing nurse can place an eSignature**    Nurse 2 eSignature: Electronically signed by VICKIE MCPHERSON RN on 1/10/25 at 2:11 PM EST

## 2025-01-10 NOTE — PROGRESS NOTES
A complete drug regimen review was completed for this patient.     [x]  No clinically significant medication issue was identified    []   Yes, a clinically significant medication issue was identified     []  Adverse Drug Event:      []  Allergy:      []  Side Effect:      []  Ineffective Therapy:      []  Drug Interaction:     []  Duplicated Therapy:     []  Untreated Indication:      []  Non-adherence:     []  Other:          Electronically signed by Cheryle Hill RN on 1/10/25 at 2:10 PM EST

## 2025-01-10 NOTE — PROGRESS NOTES
Patient BP soft throughout day. Perfect serve sent to Shannen Fernández NP. NP said okay to hold Losartan dose tonight.

## 2025-01-10 NOTE — PROGRESS NOTES
Pharmacist Review and Automatic Dose Adjustment of Prophylactic Enoxaparin    The reviewing pharmacist has made an adjustment to the ordered enoxaparin dose or converted to UFH per the approved [facility] protocol and table as identified below.      Merari Cummins is a 84 y.o. female.     Recent Labs     01/09/25  0639 01/10/25  0516   CREATININE 0.7 1.1       Estimated Creatinine Clearance: 28 mL/min (based on SCr of 1.1 mg/dL).    Recent Labs     01/09/25  0639 01/10/25  0516   HGB 13.4 11.9*   HCT 40.0 36.2    176     Recent Labs     01/09/25  0639 01/10/25  0516   INR 1.01 1.05       Height:   Ht Readings from Last 1 Encounters:   01/10/25 1.524 m (5')     Weight:  Wt Readings from Last 1 Encounters:   01/10/25 53.7 kg (118 lb 6.2 oz)               Plan: Based upon the patient's weight and renal function    Ordered: Enoxaparin 40mg SUBQ Daily    Changed/converted to    New Order: Enoxaparin 30mg SUBQ Daily      Thank you,  Phong Jenkins [facility]  1/10/2025, 2:05 PM

## 2025-01-10 NOTE — PROGRESS NOTES
Patient states she feels discomfort in right hip but does not want to take pain medication as she \"is not big on taking medication unless necessary\".

## 2025-01-10 NOTE — H&P
laboratory data have been reviewed.   The above imaging data have been reviewed.   The above medical testing have been reviewed.     Body mass index is 23.12 kg/m².    POST ADMISSION PHYSICIAN EVALUATION  The patient has agreed to being admitted to our comprehensive inpatient rehabilitation facility and can tolerate the intensity of service consisting of at least:  --180 minutes of therapy a day, 5 out of 7 days a week.  OR  --15 hours of intensive therapy within a 7 consecutive day period.     The patient/family has a good understanding of our discharge process and will benefit from an interdisciplinary inpatient rehabilitation program. The patient has potential to make improvement and is in need of at least two of the following multidisciplinary therapies including but not limited to physical, occupational, respiratory, and speech, nutritional services, wound care, and prosthetics and orthotics. Given the patients complex condition and risk of further medical complications, rehabilitation services cannot be safely provided at a lower level of care such as a skilled nursing facility. All of the goals listed below were reviewed with the patient and he/she is in agreement.    I have compared the patients medical and functional status at the time of the preadmission screening and the same on this date, and there are no significant changes.    By signing this document, I acknowledge that I have personally performed a full physical examination on this patient within 24 hours of admission to this inpatient rehabilitation facility and have determined the patient to be able to tolerate the above course of treatment at an intensive level for a reasonable period of time. I will be completing a detailed individualized  Plan of Care for this patient by day four of the patients stay based upon the Preadmission Screen, this Post-Admission Evaluation, and the therapy evaluations.     Barriers: decreased right hip ROM, balance,  medical comorbidities  Services Required: PT, OT  Goals: Mia for mobility, ADLs  Prognosis: Good  Anticipated Dispo: home alone  ELOS: 10-14 days    Rehabilitation Diagnosis:   Orthopedic, 8.11, Unilateral Hip Fracture      Assessment and Plan:    Impairments: decreased right hip ROM, balance     Right intertrochanteric femur fracture   -s/p hemiarthroplasty (1/9 with Dr. Jiang).   -Wound care per Ortho  -WBAT RLE with posterolateral hip precautions.   -PT/OT    Leukocytosis  -Likely reactive  -Monitor temp and WBC trend  -Monitor for localizing signs/symptoms of infection    Anemia  -Post-surgical +/- dilutional  -Monitor Hgb, transfuse prn <7.     Microscopic polyangiitis/MPO-ANCA vasculitis   -Renal and pulmonary involvement.   -Previously treated with Rituxan.   -Monitor renal function.    HTN, labile  -continue losartan with hold parameters    HLD  -continue atorvastatin    Bladder   -High risk retention   -Monitor PVRs, SC prn >400cc    Bowel   -High risk constipation   -senna+colace BID, PRN miralax, MoM, and bisacodyl supp.    Safety   -fall and aspiration precautions    Pain control  -patient prefers acetaminophen for pain control and does not like oxycodone   -will trial tramadol if acetaminophen insufficient    DVT ppx  -continue ASA 81 BID x 30 days post op per Ortho    FULL CODE    Marilou Couch MD 1/10/2025, 3:05 PM

## 2025-01-11 LAB
ANION GAP SERPL CALCULATED.3IONS-SCNC: 8 MMOL/L (ref 3–16)
ANISOCYTOSIS BLD QL SMEAR: ABNORMAL
BASOPHILS # BLD: 0 K/UL (ref 0–0.2)
BASOPHILS NFR BLD: 0 %
BUN SERPL-MCNC: 20 MG/DL (ref 7–20)
CALCIUM SERPL-MCNC: 8.9 MG/DL (ref 8.3–10.6)
CHLORIDE SERPL-SCNC: 106 MMOL/L (ref 99–110)
CO2 SERPL-SCNC: 25 MMOL/L (ref 21–32)
CREAT SERPL-MCNC: 0.6 MG/DL (ref 0.6–1.2)
DEPRECATED RDW RBC AUTO: 13.6 % (ref 12.4–15.4)
EOSINOPHIL # BLD: 0.2 K/UL (ref 0–0.6)
EOSINOPHIL NFR BLD: 2 %
GFR SERPLBLD CREATININE-BSD FMLA CKD-EPI: 89 ML/MIN/{1.73_M2}
GLUCOSE SERPL-MCNC: 88 MG/DL (ref 70–99)
HCT VFR BLD AUTO: 36.1 % (ref 36–48)
HGB BLD-MCNC: 12 G/DL (ref 12–16)
LYMPHOCYTES # BLD: 1.8 K/UL (ref 1–5.1)
LYMPHOCYTES NFR BLD: 16 %
MCH RBC QN AUTO: 31.3 PG (ref 26–34)
MCHC RBC AUTO-ENTMCNC: 33.2 G/DL (ref 31–36)
MCV RBC AUTO: 94.5 FL (ref 80–100)
MONOCYTES # BLD: 1.2 K/UL (ref 0–1.3)
MONOCYTES NFR BLD: 11 %
NEUTROPHILS # BLD: 7.8 K/UL (ref 1.7–7.7)
NEUTROPHILS NFR BLD: 71 %
PLATELET # BLD AUTO: 177 K/UL (ref 135–450)
PLATELET BLD QL SMEAR: ADEQUATE
PMV BLD AUTO: 10.4 FL (ref 5–10.5)
POTASSIUM SERPL-SCNC: 4.7 MMOL/L (ref 3.5–5.1)
PREALB SERPL-MCNC: 14.3 MG/DL (ref 20–40)
RBC # BLD AUTO: 3.82 M/UL (ref 4–5.2)
SLIDE REVIEW: ABNORMAL
SODIUM SERPL-SCNC: 139 MMOL/L (ref 136–145)
WBC # BLD AUTO: 11 K/UL (ref 4–11)

## 2025-01-11 PROCEDURE — 6370000000 HC RX 637 (ALT 250 FOR IP): Performed by: STUDENT IN AN ORGANIZED HEALTH CARE EDUCATION/TRAINING PROGRAM

## 2025-01-11 PROCEDURE — 97162 PT EVAL MOD COMPLEX 30 MIN: CPT | Performed by: PHYSICAL THERAPIST

## 2025-01-11 PROCEDURE — 36415 COLL VENOUS BLD VENIPUNCTURE: CPT

## 2025-01-11 PROCEDURE — 1280000000 HC REHAB R&B

## 2025-01-11 PROCEDURE — 2500000003 HC RX 250 WO HCPCS: Performed by: PHYSICAL MEDICINE & REHABILITATION

## 2025-01-11 PROCEDURE — 84134 ASSAY OF PREALBUMIN: CPT

## 2025-01-11 PROCEDURE — 97530 THERAPEUTIC ACTIVITIES: CPT | Performed by: PHYSICAL THERAPIST

## 2025-01-11 PROCEDURE — 97535 SELF CARE MNGMENT TRAINING: CPT

## 2025-01-11 PROCEDURE — 85025 COMPLETE CBC W/AUTO DIFF WBC: CPT

## 2025-01-11 PROCEDURE — 97116 GAIT TRAINING THERAPY: CPT | Performed by: PHYSICAL THERAPIST

## 2025-01-11 PROCEDURE — 6360000002 HC RX W HCPCS: Performed by: PHYSICAL MEDICINE & REHABILITATION

## 2025-01-11 PROCEDURE — 97166 OT EVAL MOD COMPLEX 45 MIN: CPT

## 2025-01-11 PROCEDURE — 80048 BASIC METABOLIC PNL TOTAL CA: CPT

## 2025-01-11 PROCEDURE — 6370000000 HC RX 637 (ALT 250 FOR IP): Performed by: PHYSICAL MEDICINE & REHABILITATION

## 2025-01-11 PROCEDURE — 94760 N-INVAS EAR/PLS OXIMETRY 1: CPT

## 2025-01-11 PROCEDURE — 97110 THERAPEUTIC EXERCISES: CPT | Performed by: PHYSICAL THERAPIST

## 2025-01-11 RX ORDER — LOSARTAN POTASSIUM 25 MG/1
25 TABLET ORAL NIGHTLY
Status: DISCONTINUED | OUTPATIENT
Start: 2025-01-11 | End: 2025-01-18 | Stop reason: HOSPADM

## 2025-01-11 RX ADMIN — ENOXAPARIN SODIUM 30 MG: 100 INJECTION SUBCUTANEOUS at 07:34

## 2025-01-11 RX ADMIN — SODIUM CHLORIDE, PRESERVATIVE FREE 10 ML: 5 INJECTION INTRAVENOUS at 13:39

## 2025-01-11 RX ADMIN — ATORVASTATIN CALCIUM 20 MG: 20 TABLET, FILM COATED ORAL at 20:19

## 2025-01-11 RX ADMIN — LOSARTAN POTASSIUM 25 MG: 25 TABLET, FILM COATED ORAL at 20:20

## 2025-01-11 RX ADMIN — ASPIRIN 81 MG: 81 TABLET, COATED ORAL at 20:19

## 2025-01-11 RX ADMIN — TIMOLOL MALEATE 1 DROP: 5 SOLUTION OPHTHALMIC at 07:42

## 2025-01-11 RX ADMIN — ACETAMINOPHEN 650 MG: 325 TABLET ORAL at 13:35

## 2025-01-11 RX ADMIN — BRIMONIDINE TARTRATE 1 DROP: 2 SOLUTION OPHTHALMIC at 20:18

## 2025-01-11 RX ADMIN — Medication 2000 UNITS: at 07:36

## 2025-01-11 RX ADMIN — TIMOLOL MALEATE 1 DROP: 5 SOLUTION OPHTHALMIC at 20:20

## 2025-01-11 RX ADMIN — BRIMONIDINE TARTRATE 1 DROP: 2 SOLUTION OPHTHALMIC at 07:44

## 2025-01-11 RX ADMIN — SODIUM CHLORIDE, PRESERVATIVE FREE 10 ML: 5 INJECTION INTRAVENOUS at 20:30

## 2025-01-11 RX ADMIN — ACETAMINOPHEN 650 MG: 325 TABLET ORAL at 20:18

## 2025-01-11 RX ADMIN — SENNOSIDES AND DOCUSATE SODIUM 1 TABLET: 50; 8.6 TABLET ORAL at 07:34

## 2025-01-11 RX ADMIN — ASPIRIN 81 MG: 81 TABLET, COATED ORAL at 07:34

## 2025-01-11 RX ADMIN — ACETAMINOPHEN 650 MG: 325 TABLET ORAL at 07:35

## 2025-01-11 ASSESSMENT — PAIN SCALES - GENERAL
PAINLEVEL_OUTOF10: 7
PAINLEVEL_OUTOF10: 0
PAINLEVEL_OUTOF10: 5
PAINLEVEL_OUTOF10: 5
PAINLEVEL_OUTOF10: 3
PAINLEVEL_OUTOF10: 7

## 2025-01-11 ASSESSMENT — PAIN DESCRIPTION - ONSET
ONSET: ON-GOING
ONSET: ON-GOING

## 2025-01-11 ASSESSMENT — PAIN - FUNCTIONAL ASSESSMENT
PAIN_FUNCTIONAL_ASSESSMENT: ACTIVITIES ARE NOT PREVENTED
PAIN_FUNCTIONAL_ASSESSMENT: PREVENTS OR INTERFERES WITH MANY ACTIVE NOT PASSIVE ACTIVITIES
PAIN_FUNCTIONAL_ASSESSMENT: ACTIVITIES ARE NOT PREVENTED

## 2025-01-11 ASSESSMENT — PAIN DESCRIPTION - DESCRIPTORS
DESCRIPTORS: ACHING;DISCOMFORT
DESCRIPTORS: ACHING;DISCOMFORT
DESCRIPTORS: ACHING

## 2025-01-11 ASSESSMENT — PAIN DESCRIPTION - LOCATION
LOCATION: HIP
LOCATION: HIP

## 2025-01-11 ASSESSMENT — PAIN DESCRIPTION - ORIENTATION
ORIENTATION: RIGHT;LEFT
ORIENTATION: RIGHT
ORIENTATION: RIGHT

## 2025-01-11 ASSESSMENT — PAIN SCALES - WONG BAKER
WONGBAKER_NUMERICALRESPONSE: NO HURT
WONGBAKER_NUMERICALRESPONSE: NO HURT

## 2025-01-11 ASSESSMENT — PAIN DESCRIPTION - PAIN TYPE
TYPE: ACUTE PAIN;SURGICAL PAIN
TYPE: SURGICAL PAIN;ACUTE PAIN

## 2025-01-11 ASSESSMENT — PAIN DESCRIPTION - FREQUENCY
FREQUENCY: CONTINUOUS
FREQUENCY: CONTINUOUS

## 2025-01-11 NOTE — PROGRESS NOTES
Patient admitted to rehab with closed fracture of right hip,(R Hip Hemiarthroplasty).  A/Ox4. Transfers with rolling walker and gait belt CGA. Mobility restrictions: WBAT, posterolateral approach hp precautions. On Regular diet, tolerating well. Medications taken whole with thin liquids. On Aspirin and Lovenox for DVT prophylaxis.  Skin: surgical incision right hip w/Phrine, skin tear L elbow Mepilex intact. Oxygen: RA. LDA: PIV - RAC & LFA . Has been continent of bowel and bladder. LBM 1/11/2025. Chair/bed alarms in use and call light in reach. Will monitor for safety. Electronically signed by Griselda Garcia RN on 1/11/2025 at 5:08 PM

## 2025-01-11 NOTE — PROGRESS NOTES
Precautions: Posterior hip precautions  Other Position/Activity Restrictions: No adduction, flexion beyond 90 degrees of operative hip. Keep \"toes to nabil\" in bed. Follow these restrictions for 3 months postoperatively or until restrictions are released from surgeon.     SUBJECTIVE  Subjective: Pt agreeable to PT Sharonal and Tx.    Prior Level of Function:  Social/Functional History  Lives With: Alone  Type of Home: House  Home Layout: Able to Live on Main level with bedroom/bathroom, Performs ADL's on one level, Multi-level, Laundry in basement ([place] - pt does not go to second floor/basement)  Home Access: Stairs to enter with rails  Entrance Stairs - Number of Steps: 3 MISTI with L handrail  Entrance Stairs - Rails: Left  Bathroom Shower/Tub: Walk-in shower, Curtain  Bathroom Toilet: Standard (when seated L vanity, low toilet)  Bathroom Equipment: Grab bars in shower (2 bars)  Bathroom Accessibility: Walker accessible  Home Equipment: Cane  Has the patient had two or more falls in the past year or any fall with injury in the past year?: Yes  Prior Level of Assist for ADLs: Independent  Prior Level of Assist for Homemaking: Independent  Prior Level of Assist for Ambulation: Independent household ambulator, with or without device, Independent community ambulator, with or without device (no device)  Prior Level of Assist for Transfers: Independent  Active : Yes  Mode of Transportation: Car  Education: [employer] sedan  Occupation: Part time employment  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 Now presents to ARU with impaired mobility and self-care below her baseline. Currently, patient reports mild-moderate right hip pain. Worse with movement and weight bearing. Improves with rest and acetaminophen. She also has more mild right shoulder pain. She denies any sensory changes. Pt lives alone in a 1 1/2 story home with basement laundry. She ststes she enters the front of the home where there are 3STE with LHR. She was able to perform bed mobility, transfers and amb household distances with CGA. She could negotiate the stairs with BHR and curb with RW and CGA, she was a little anxious. She would benefit from skilled PT on our ARU for strengthening, bed mobility, gait, transfers and stairs so she could go home alone safely.    GOALS  Patient Goals   Patient Goals : \"To be able to stay in my home.\"  Short Term Goals  Time Frame for Short Term Goals: 7-10 days  Short Term Goal 1: Bed mobility with MI  Short Term Goal 2: Transfers with MI  Short Term Goal 3: Amb with RW and MI x200'  Short Term Goal 4: Up/down 12 steps with BHR and MI  Short Term Goal 5: Up/down curb with RW and MI.    PLAN OF CARE  Frequency: 1-2 treatment sessions per day, 5-7 days per week  Physical Therapy Plan  General Plan:  minutes of therapy at least 5 out of 7 days a week  Days Per Week: 5 Days  Hours Per Day: 1.5 hours  Therapy Duration: 10 Days  Current Treatment Recommendations: Strengthening;Functional mobility training;Transfer training;ADL/Self-care training;Gait training;Modalities;Therapeutic activities;Patient/Caregiver education & training;ROM;Balance training;Endurance training;Stair training;Home exercise program;Safety education & training;Equipment evaluation, education, & procurement;Positioning  Safety Devices  Type of Devices: All fall risk precautions in place;Call light within reach;Gait belt;Patient at risk for falls;Left in chair;Chair alarm in place;Nurse notified

## 2025-01-11 NOTE — DISCHARGE SUMMARY
Hospital Medicine Discharge Summary    Patient ID: Merari Cummins      Patient's PCP: Jaquan Smith MD    Admit Date: 1/9/2025     Discharge Date: 1/10/2025      Admitting Provider: Lynette Murray MD     Discharge Provider: Moira Bryan MD     Discharge Diagnoses:       Active Hospital Problems    Diagnosis     Intertrochanteric fracture of right femur, closed, initial encounter ([facility]) [S72.141A]     Closed fracture of right hip ([facility]) [S72.001A]        The patient was seen and examined on day of discharge and this discharge summary is in conjunction with any daily progress note from day of discharge.    Hospital Course:   Merari Cummins is a 84 y.o. female with pmh of vasculitis, HTN, HLD, depression who presents with Intertrochanteric fracture of right femur, closed, initial encounter    [facility] surgery were consulted.  Patient underwent right hip hemiarthroplasty on 1/9/2025 and tolerated well.  PT OT were consulted and recommended inpatient rehab.  Patient was discharged to an inpatient rehab for rehabitation of the right hip  Patient is follow-up with orthopedic surgery outpatient with Dr. Jiang on in 2 weeks  Patient's follow-up PCP in 3 to 5 days or sooner if needed  Patient is to return to the ED if symptoms return          Physical Exam Performed:     /64   Pulse 87   Temp 98 °F (36.7 °C) (Axillary)   Resp 17   Ht 1.524 m (5')   Wt 54.6 kg (120 lb 5.9 oz)   SpO2 94%   BMI 23.51 kg/m²     General appearance: No apparent distress,  cooperative.  HEENT: Pupils equal, round, and reactive to light. Conjunctivae/corneas clear.  Neck: Supple, with full range of motion. Trachea midline.  Respiratory:  Normal respiratory effort. Clear to auscultation  Cardiovascular: Regular rate and rhythm with normal S1/S2   Abdomen: Soft, non-tender, non-distended   Musculoskeletal: No edema bilaterally.  Full range of motion without deformity.  Skin: Skin color, texture, turgor normal.  No rashes or

## 2025-01-11 NOTE — PROGRESS NOTES
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 She fell approximately 12 hours before coming into the ER.  States that she got her slipper caught on loose dustin resulting in fall. Denies head trauma or loss of consciousness.  Initially able to ambulate but then hip became progressively more painful and she was unable to bear weight on it.  Diagnostic work-up revealed displaced right femoral neck fracture. Patient underwent right hemiarthroplasty ( with Dr. Jiang). Now WBAT RLE with posterlateral hip precautions. Course complicated by leukocytosis, anemia, labile blood pressure. Now presents to ARU with impaired mobility and self-care below her baseline. Currently, patient reports mild-moderate right hip pain. Worse with movement and weight bearing. Improves with rest and acetaminophen. She also has more mild right shoulder pain.\"Copied per Dr Couch's H & P on 25    Restrictions  Restrictions/Precautions: Fall Risk;Weight Bearing  Hip Precautions: Posterior hip precautions  Other Position/Activity Restrictions: No adduction, flexion beyond 90 degrees of operative hip. Keep \"toes to nabil\" in bed. Follow these restrictions for 3 months postoperatively or until restrictions are released from surgeon.  Right Lower Extremity Weight Bearing: Weight Bearing As Tolerated  Equipment Used:  (RW)    Subjective  Subjective: Pt met in room seated in recliner  ;Decreased endurance;Decreased ADL status;Decreased balance;Decreased strength;Decreased high-level IADLs  Assessment: Pt is an 83 yo Female admitted with intertrochanteric fx of R femur after a fall, s/p R hip hemiarthroplasty. She is WBAT with posterior hip precautions. Prior to onset she lives at alone in multilevel home w/ 3 steps to enter. She typically fully IND without device,working part time, driving.  Today pt bathed w/ min-mod A to shower on chair, dressed UB set-up, LB max A & dep for footwear, toileted w/ mod A. She completed ADL transfers/mob with CGA using RW + cues. She is limited by posterior hip precautions, pain and decreased activity tolerance. Pt  functioning well below baseline of IND function. Pt motivated to return to IND function, to go home alone, & be able to resume her prior occupations, nathaly. caring for her dogs. Anticipate inpt OT on ARU 7-10 days from eval. Rec home OT post D/C.  Treatment Diagnosis: Impaired: ADL/IADL function, mobility/transfers, strength, balance, activity tolerance  Prognosis: Good  Decision Making: Medium Complexity  History: Pt is an 83 yo F admitted with intertrochanteric fx of R femur after a fall, s/p RIGHT HIP HEMIARTHROPLASTY per Dr. Jiang 1-9-25. WBAT, posterior hip precautions. PMH including: fx of R distal radius (2019).  Exam: ADL function, mobility/transfers, strength, balance, activity tolerance  Assistance / Modification: Today pt bathed w/ min-mod A to shower on chair, dressed UB set-up, LB max A & dep for footwear, toileted w/ mod A. She completed ADL transfers/mob with CGA using RW + cues.  Treatment Initiated : 1/11/25  Discharge Recommendations: Home with Home health OT;Home with assist PRN  Occupational Therapy Plan  Times Per Week: 5-6  Times Per Day: Twice a day  Days Per Week: 5 Days  Hours Per Day: 1.5 hours  Therapy Duration: 10 Days (7-10)  Current Treatment Recommendations: Strengthening;Balance training;Functional mobility training;Safety

## 2025-01-11 NOTE — PLAN OF CARE
Problem: Discharge Planning  Goal: Discharge to home or other facility with appropriate resources  Outcome: Progressing     Problem: Safety - Adult  Goal: Free from fall injury  Outcome: Progressing     Problem: Skin/Tissue Integrity  Goal: Absence of new skin breakdown  Description: 1.  Monitor for areas of redness and/or skin breakdown  2.  Assess vascular access sites hourly  3.  Every 4-6 hours minimum:  Change oxygen saturation probe site  4.  Every 4-6 hours:  If on nasal continuous positive airway pressure, respiratory therapy assess nares and determine need for appliance change or resting period.  Outcome: Progressing     Problem: ABCDS Injury Assessment  Goal: Absence of physical injury  Outcome: Progressing     Problem: Musculoskeletal - Adult  Goal: Return mobility to safest level of function  Outcome: Progressing  Goal: Maintain proper alignment of affected body part  Outcome: Progressing  Goal: Return ADL status to a safe level of function  Outcome: Progressing     Problem: Pain  Goal: Verbalizes/displays adequate comfort level or baseline comfort level  Outcome: Progressing

## 2025-01-11 NOTE — PROGRESS NOTES
This is a 84 y.o.  female admitted on 1/10/2025 with Closed displaced intertrochanteric fracture of right femur, initial encounter ([facility]) [S72.792A].  A&O X 4. Reported  pain 6/10 to right hip, pain medication given as ordered. See MAR.  Active bowel sounds. Last BM 1/8/25. She is continent of bowel & bladder. She is on a regular diet. Medications taken whole with thins. On Lovenox for DVT prophylaxis. Skin: surgical incision to right hip, surgical glue in place.  Saline lock to RAC and LFA, flushing well. Transfers with stedy x 1. HS medication given. Tolerated well.  Call light  and bedside table within reach. Patient instructed to call if she needs anything.

## 2025-01-11 NOTE — PLAN OF CARE
Problem: Safety - Adult  Goal: Free from fall injury  1/11/2025 0014 by Cassidy Magallanes, RN  Outcome: Progressing     Problem: Skin/Tissue Integrity  Goal: Absence of new skin breakdown  Description: 1.  Monitor for areas of redness and/or skin breakdown  2.  Assess vascular access sites hourly  3.  Every 4-6 hours minimum:  Change oxygen saturation probe site  4.  Every 4-6 hours:  If on nasal continuous positive airway pressure, respiratory therapy assess nares and determine need for appliance change or resting period.  1/11/2025 0014 by Cassidy Magallanes, RN  Outcome: Progressing     Problem: ABCDS Injury Assessment  Goal: Absence of physical injury  1/11/2025 0014 by Cassidy Magallanes, RN  Outcome: Progressing     Problem: Pain  Goal: Verbalizes/displays adequate comfort level or baseline comfort level  Outcome: Progressing

## 2025-01-11 NOTE — PROGRESS NOTES
Spiritual Health History and Assessment/Progress Note  [facility]    Spiritual/Emotional Needs,  ,  ,      Name: Merari Cummins MRN: [id number]    Age: 84 y.o.     Sex: female   Language: English   Adventism: Unknown   Closed displaced intertrochanteric fracture of right femur, initial encounter ([facility])     Date: 1/11/2025            Total Time Calculated: (P) 13 min              Spiritual Assessment began in [facility]        Referral/Consult From: (P) Nurse (TJ Lobato)   Encounter Overview/Reason: Spiritual/Emotional Needs  Service Provided For: Patient    Hannah, Belief, Meaning:   Patient is connected with a hannah tradition or spiritual practice, has beliefs or practices that help with coping during difficult times, and Other: Pt attends [facility],  knows pt is in hospital.  Family/Friends No family/friends present      Importance and Influence:  Patient has spiritual/personal beliefs that influence decisions regarding their health  Family/Friends No family/friends present    Community:  Patient is connected with a spiritual community and feels well-supported. Support system includes: Extended family and Other: Nieces/nephew , pt expressed a feeling of guilt/burden to family.  Family/Friends No family/friends present    Assessment and Plan of Care:   Patient Interventions include: Facilitated expression of thoughts and feelings, Explored spiritual coping/struggle/distress, Facilitated life review and/ or legacy, and Other: Pt shared feelings guilt, and not trusting.  and pt explores pts trust with G-d. Pt is hopeful,  will bring pt devotional.     Patient Plan of Care: No spiritual needs identified for follow-up    Electronically signed by MAGALI Paige on 1/11/2025 at 1:53 PM

## 2025-01-11 NOTE — PLAN OF CARE
ARU PATIENT TREATMENT PLAN  [facility]   [address] [place]  [address]  [phone]    Merari Cummins    : 1940  [id number]  MRN: [id number]   PHYSICIAN:  Marilou Couch MD  Primary Problem    Patient Active Problem List   Diagnosis    Closed fracture of right distal radius    Intertrochanteric fracture of right femur, closed, initial encounter ([facility])    Closed fracture of right hip ([facility])    Closed displaced intertrochanteric fracture of right femur, initial encounter ([facility])       Rehabilitation Diagnosis:     Closed displaced intertrochanteric fracture of right femur, initial encounter ([facility]) [S72.141A]       ADMIT DATE:1/10/2025    Patient Goals: \"To be able to stay in my home.\"     Admitting Impairments: decreased right hip ROM, balance      Right intertrochanteric femur fracture   -s/p hemiarthroplasty ( with Dr. Jiang).   -Wound care per Ortho  -WBAT RLE with posterolateral hip precautions.   -PT/OT     Leukocytosis  -Likely reactive  -Monitor temp and WBC trend  -Monitor for localizing signs/symptoms of infection     Anemia  -Post-surgical +/- dilutional  -Monitor Hgb, transfuse prn <7.      Microscopic polyangiitis/MPO-ANCA vasculitis   -Renal and pulmonary involvement.   -Previously treated with Rituxan.   -Monitor renal function.     HTN, labile  -continue losartan with hold parameters     HLD  -continue atorvastat  Barriers: decreased right hip ROM, balance, medical comorbidities   Participation: good     CARE PLAN     NURSING:  Merari Cummins while on this unit will:     [] Be continent of bowel and bladder     [x] Have an adequate number of bowel movements  [x] Urinate with no urinary retention >300ml in bladder  [] Complete bladder protocol with juan removal  [x] Maintain O2 SATs at 92%  [x] Have pain managed while on ARU       [] Be pain free by discharge   [x] Have no skin breakdown while on ARU  [x] Have improved skin integrity via wound  Positioning      OCCUPATIONAL THERAPY:  Goals:             Short Term Goals  Time Frame for Short Term Goals: 7-10 days from eval on 1/11/25  Short Term Goal 1: Pt will bathe modified IND w/ shower chair/grab bar/long handled sponge  Short Term Goal 2: Pt will dress modified IND w/ use of AE  Short Term Goal 3: Pt will toilet modified IND w/ rails/grab bars, & determine what DME needed for home  Short Term Goal 4: Pt will complete ADL transfers/mob modified IND w/ RW  Short Term Goal 5: Pt will bed educated in/demo understanding of walker safety/application of hip precautions for ADL/simple IADL tasks w/o cues  Short Term Goal 6: Pt will participate in ther ex/HEP to increase activity tolerance for stand for 8-10 minutes for simple IADL tasks modified IND :  Long Term Goals  Time Frame for Long Term Goals : STG=LTG :    These goals were reviewed with this patient at the time of assessment and Merari Cummins is in agreement    Plan of Care:  Pt to be seen 90 mins per day for 5 day/week 1-1.5 weeks.           SPEECH THERAPY: Goals will be left blank if speech is not following this patient.  Goals:                                                                           These goals were reviewed with this patient at the time of assessment and Merari Cummins is in agreement    Plan of Care: Pt to be seen    mins per day for  day/week  weeks.          CASE MANAGEMENT:  Goals:   Assist patient/family with discharge planning, patient/family counseling,   and coordination with insurance during ARU stay.      Admission Period/Goal QIM CODES   QIM  Admit/Goal Score    Eating     Oral Hygiene     Toileting Hygiene     Shower/Bathe Self     Upper Body Dressing     Lower Body Dressing     Putting on/Taking off Footwear     Roll Left and Right     Sit to Lying     Lying to Sitting on Side of Bed     Sit to Stand     Chair/Bed to Chair Transfer     Toilet Transfer     Car Transfer     Walk 10 feet     Walk 50 feet with 2 Turns     Walk

## 2025-01-12 PROCEDURE — 6370000000 HC RX 637 (ALT 250 FOR IP): Performed by: PHYSICAL MEDICINE & REHABILITATION

## 2025-01-12 PROCEDURE — 2500000003 HC RX 250 WO HCPCS: Performed by: PHYSICAL MEDICINE & REHABILITATION

## 2025-01-12 PROCEDURE — 6370000000 HC RX 637 (ALT 250 FOR IP): Performed by: STUDENT IN AN ORGANIZED HEALTH CARE EDUCATION/TRAINING PROGRAM

## 2025-01-12 PROCEDURE — 6360000002 HC RX W HCPCS: Performed by: PHYSICAL MEDICINE & REHABILITATION

## 2025-01-12 PROCEDURE — 1280000000 HC REHAB R&B

## 2025-01-12 RX ADMIN — BRIMONIDINE TARTRATE 1 DROP: 2 SOLUTION OPHTHALMIC at 08:01

## 2025-01-12 RX ADMIN — ATORVASTATIN CALCIUM 20 MG: 20 TABLET, FILM COATED ORAL at 20:20

## 2025-01-12 RX ADMIN — ACETAMINOPHEN 650 MG: 325 TABLET ORAL at 01:42

## 2025-01-12 RX ADMIN — TIMOLOL MALEATE 1 DROP: 5 SOLUTION OPHTHALMIC at 20:21

## 2025-01-12 RX ADMIN — PROMETHAZINE HYDROCHLORIDE 12.5 MG: 25 TABLET ORAL at 05:28

## 2025-01-12 RX ADMIN — ACETAMINOPHEN 650 MG: 325 TABLET ORAL at 08:12

## 2025-01-12 RX ADMIN — Medication 2000 UNITS: at 08:02

## 2025-01-12 RX ADMIN — BRIMONIDINE TARTRATE 1 DROP: 2 SOLUTION OPHTHALMIC at 20:21

## 2025-01-12 RX ADMIN — ASPIRIN 81 MG: 81 TABLET, COATED ORAL at 20:20

## 2025-01-12 RX ADMIN — ASPIRIN 81 MG: 81 TABLET, COATED ORAL at 08:02

## 2025-01-12 RX ADMIN — ENOXAPARIN SODIUM 30 MG: 100 INJECTION SUBCUTANEOUS at 08:02

## 2025-01-12 RX ADMIN — ACETAMINOPHEN 650 MG: 325 TABLET ORAL at 15:49

## 2025-01-12 RX ADMIN — TIMOLOL MALEATE 1 DROP: 5 SOLUTION OPHTHALMIC at 08:01

## 2025-01-12 RX ADMIN — SODIUM CHLORIDE, PRESERVATIVE FREE 10 ML: 5 INJECTION INTRAVENOUS at 20:22

## 2025-01-12 RX ADMIN — SODIUM CHLORIDE, PRESERVATIVE FREE 10 ML: 5 INJECTION INTRAVENOUS at 08:03

## 2025-01-12 RX ADMIN — LOSARTAN POTASSIUM 25 MG: 25 TABLET, FILM COATED ORAL at 20:20

## 2025-01-12 RX ADMIN — ACETAMINOPHEN 650 MG: 325 TABLET ORAL at 22:15

## 2025-01-12 ASSESSMENT — PAIN SCALES - GENERAL
PAINLEVEL_OUTOF10: 0
PAINLEVEL_OUTOF10: 3
PAINLEVEL_OUTOF10: 0
PAINLEVEL_OUTOF10: 6
PAINLEVEL_OUTOF10: 6
PAINLEVEL_OUTOF10: 4
PAINLEVEL_OUTOF10: 7
PAINLEVEL_OUTOF10: 3

## 2025-01-12 ASSESSMENT — PAIN DESCRIPTION - LOCATION
LOCATION: HIP

## 2025-01-12 ASSESSMENT — PAIN DESCRIPTION - ONSET
ONSET: ON-GOING
ONSET: ON-GOING

## 2025-01-12 ASSESSMENT — PAIN DESCRIPTION - ORIENTATION
ORIENTATION: RIGHT

## 2025-01-12 ASSESSMENT — PAIN SCALES - WONG BAKER
WONGBAKER_NUMERICALRESPONSE: NO HURT
WONGBAKER_NUMERICALRESPONSE: NO HURT

## 2025-01-12 ASSESSMENT — PAIN DESCRIPTION - DESCRIPTORS
DESCRIPTORS: ACHING;DISCOMFORT
DESCRIPTORS: ACHING
DESCRIPTORS: ACHING;DISCOMFORT
DESCRIPTORS: ACHING

## 2025-01-12 ASSESSMENT — PAIN - FUNCTIONAL ASSESSMENT
PAIN_FUNCTIONAL_ASSESSMENT: PREVENTS OR INTERFERES WITH MANY ACTIVE NOT PASSIVE ACTIVITIES
PAIN_FUNCTIONAL_ASSESSMENT: ACTIVITIES ARE NOT PREVENTED
PAIN_FUNCTIONAL_ASSESSMENT: ACTIVITIES ARE NOT PREVENTED

## 2025-01-12 ASSESSMENT — PAIN DESCRIPTION - PAIN TYPE
TYPE: SURGICAL PAIN;ACUTE PAIN
TYPE: ACUTE PAIN;SURGICAL PAIN

## 2025-01-12 ASSESSMENT — PAIN DESCRIPTION - FREQUENCY
FREQUENCY: CONTINUOUS
FREQUENCY: CONTINUOUS

## 2025-01-12 NOTE — FLOWSHEET NOTE
Patient admitted to ARU with dx of closed fracture of right hip,(R Hip Hemiarthroplasty) sustained fracture during a fall in her kitchen.  A/Ox4. Transfers with rolling walker and gait belt CGA x11. Mobility restrictions: WBAT, posterolateral approach hip precautions. On Regular diet, tolerating well. Medications taken whole with thin liquids. On Aspirin and Lovenox for DVT prophylaxis.  Skin: surgical incision right hip w/Prineo dressing intact, skin tear R elbow, Mepilex intact. Oxygen: RA. LDA: PIV - on  LFA flushed for patency. 2nd PIV removed from RAC has leakage.  Has been continent of bowel and bladder. LBM 1/11/2025. Chair/bed alarms in use and call light within reach. Will monitor for safety.

## 2025-01-12 NOTE — PROGRESS NOTES
[facility] Inpatient Rehabilitation Progress Note    Merari Cummins  1/11/2025  [id number]    Chief Complaint: Closed displaced intertrochanteric fracture of right femur, initial encounter ([facility])    Subjective:   Patient denies any new onset complaints.     Nursing notes that patient normally takes her losartan qhs, refusing to take BID here.    Objective:  Patient Vitals for the past 24 hrs:   BP Temp Temp src Pulse Resp SpO2 Weight   01/11/25 2007 (!) 149/79 97.9 °F (36.6 °C) Oral 82 18 95 % --   01/11/25 0715 135/80 98.4 °F (36.9 °C) Oral 84 16 92 % --   01/11/25 0530 -- -- -- -- -- -- 54.9 kg (121 lb 0.5 oz)       Gen: No distress, pleasant.   HEENT: Normocephalic, atraumatic.   CV: Regular rate and rhythm.   Resp: No respiratory distress.   Abd: Soft, nontender   Ext: No edema.   Neuro: Alert, oriented, appropriately interactive.     Wt Readings from Last 3 Encounters:   01/11/25 54.9 kg (121 lb 0.5 oz)   01/10/25 54.6 kg (120 lb 5.9 oz)   02/04/20 59 kg (130 lb)       Laboratory data:   Lab Results   Component Value Date    WBC 11.0 01/11/2025    HGB 12.0 01/11/2025    HCT 36.1 01/11/2025    MCV 94.5 01/11/2025     01/11/2025       Lab Results   Component Value Date/Time     01/11/2025 06:00 AM    K 4.7 01/11/2025 06:00 AM     01/11/2025 06:00 AM    CO2 25 01/11/2025 06:00 AM    BUN 20 01/11/2025 06:00 AM    CREATININE 0.6 01/11/2025 06:00 AM    GLUCOSE 88 01/11/2025 06:00 AM    CALCIUM 8.9 01/11/2025 06:00 AM        Therapy progress:       PT    Supine to Sit:    Sit to Supine:     Sit to Stand:    Chair/Bed to Chair Transfer:    Car Transfer:    Ambulation 10 ft:    Ambulation 50 ft:    Ambulation 150 ft:    Stairs - 1 Step:    Stairs - 4 Step:    Stairs - 12 Step:      OT    Eating: Independent  Oral Hygiene: Setup or clean-up assistance  Bathing: Partial/moderate assistance  Upper Body Dressing: Setup or clean-up assistance  Lower Body Dressing: Substantial/maximal assistance  Toilet  Transfer: Substantial/maximal assistance  Toilet Hygiene: Partial/moderate assistance    Speech Therapy         ADULT DIET; Regular    Body mass index is 23.64 kg/m².    Assessment and Plan:    Impairments: decreased right hip ROM, balance      Right intertrochanteric femur fracture   -s/p hemiarthroplasty (1/9 with Dr. Jiang).   -Wound care per Ortho  -WBAT RLE with posterolateral hip precautions.   -PT/OT     Leukocytosis  -Likely reactive  -Monitor temp and WBC trend  -Monitor for localizing signs/symptoms of infection     Anemia  -Post-surgical +/- dilutional  -Monitor Hgb, transfuse prn <7.      Microscopic polyangiitis/MPO-ANCA vasculitis   -Renal and pulmonary involvement.   -Previously treated with Rituxan.   -Monitor renal function.     HTN, labile  -continue losartan with hold parameters   -Losartan decreased from BID to qhs     HLD  -continue atorvastatin     Bladder   -High risk retention   -Monitor PVRs, SC prn >400cc     Bowel   -High risk constipation   -senna+colace BID, PRN miralax, MoM, and bisacodyl supp.     Safety   -fall and aspiration precautions     Pain control  -patient prefers acetaminophen for pain control and does not like oxycodone   -will trial tramadol if acetaminophen insufficient     DVT ppx  -continue ASA 81 BID x 30 days post op per Ortho     FULL CODE    Santana Guerrero,    1/11/2025, 9:03 PM

## 2025-01-12 NOTE — PROGRESS NOTES
Patient admitted to rehab with closed fracture of right hip,(R Hip Hemiarthroplasty).  A/Ox4. Transfers with rolling walker and gait belt CGA. Mobility restrictions: WBAT, posterolateral approach hip precautions. On Regular diet, tolerating well. Medications taken whole with thin liquids. On Aspirin and Lovenox for DVT prophylaxis.  Skin: surgical incision right hip w/Prineo, skin tear L elbow Mepilex intact. Oxygen: RA. LDA: PIV - LFA . Has been continent of bowel and bladder. LBM 1/12/2025. Chair/bed alarms in use and call light in reach. Will monitor for safety. Electronically signed by Griselda Garcia RN on 1/12/2025 at 10:41 AM

## 2025-01-12 NOTE — PLAN OF CARE
Problem: Discharge Planning  Goal: Discharge to home or other facility with appropriate resources  1/12/2025 1040 by Griselda Garcia RN  Outcome: Progressing     Problem: Safety - Adult  Goal: Free from fall injury  1/12/2025 1040 by Griselda Garcia RN  Outcome: Progressing     Problem: Skin/Tissue Integrity  Goal: Absence of new skin breakdown  Description: 1.  Monitor for areas of redness and/or skin breakdown  2.  Assess vascular access sites hourly  3.  Every 4-6 hours minimum:  Change oxygen saturation probe site  4.  Every 4-6 hours:  If on nasal continuous positive airway pressure, respiratory therapy assess nares and determine need for appliance change or resting period.  1/12/2025 1040 by Griselda Garcia RN  Outcome: Progressing     Problem: ABCDS Injury Assessment  Goal: Absence of physical injury  1/12/2025 1040 by Griselda Garcia RN  Outcome: Progressing     Problem: Musculoskeletal - Adult  Goal: Return mobility to safest level of function  1/12/2025 1040 by Griselda Garcia RN  Outcome: Progressing     Problem: Musculoskeletal - Adult  Goal: Maintain proper alignment of affected body part  1/12/2025 1040 by Griselda Garcia RN  Outcome: Progressing     Problem: Musculoskeletal - Adult  Goal: Return ADL status to a safe level of function  1/12/2025 1040 by Griselda Garcia RN  Outcome: Progressing     Problem: Pain  Goal: Verbalizes/displays adequate comfort level or baseline comfort level  1/12/2025 1040 by Griselda Garcia RN  Outcome: Progressing

## 2025-01-12 NOTE — PROGRESS NOTES
Pt only wants Tylenol, Education was provided and states, not interested in trying Ultram. Electronically signed by Griselda Garcia RN on 1/12/2025 at 4:23 PM

## 2025-01-12 NOTE — PLAN OF CARE
Problem: Discharge Planning  Goal: Discharge to home or other facility with appropriate resources  1/12/2025 0208 by Melissa Pedroza RN  Outcome: Progressing  1/11/2025 1449 by Griselda Garcia RN  Outcome: Progressing     Problem: Safety - Adult  Goal: Free from fall injury  1/12/2025 0208 by Melissa Pedroza RN  Outcome: Progressing  1/11/2025 1449 by Griselda Garcia RN  Outcome: Progressing     Problem: Skin/Tissue Integrity  Goal: Absence of new skin breakdown  Description: 1.  Monitor for areas of redness and/or skin breakdown  2.  Assess vascular access sites hourly  3.  Every 4-6 hours minimum:  Change oxygen saturation probe site  4.  Every 4-6 hours:  If on nasal continuous positive airway pressure, respiratory therapy assess nares and determine need for appliance change or resting period.  1/12/2025 0208 by Melissa Pedroza RN  Outcome: Progressing  1/11/2025 1449 by Griselda Garcia RN  Outcome: Progressing     Problem: ABCDS Injury Assessment  Goal: Absence of physical injury  1/12/2025 0208 by Melissa Pedroza RN  Outcome: Progressing  1/11/2025 1449 by Griselda Garcia RN  Outcome: Progressing     Problem: Musculoskeletal - Adult  Goal: Return mobility to safest level of function  1/12/2025 0208 by Melissa Pedroza RN  Outcome: Progressing  1/11/2025 1449 by Griselda Garcia RN  Outcome: Progressing  Goal: Maintain proper alignment of affected body part  1/12/2025 0208 by Melissa Pedroza RN  Outcome: Progressing  1/11/2025 1449 by Griselda Garcia RN  Outcome: Progressing  Goal: Return ADL status to a safe level of function  1/12/2025 0208 by Melissa Pedroza RN  Outcome: Progressing  1/11/2025 1449 by Griselda Garcia RN  Outcome: Progressing     Problem: Pain  Goal: Verbalizes/displays adequate comfort level or baseline comfort level  1/12/2025 0208 by Melissa Pedroza RN  Outcome: Progressing  1/11/2025 1449 by Griselda Garcia RN  Outcome: Progressing

## 2025-01-13 LAB
ANION GAP SERPL CALCULATED.3IONS-SCNC: 10 MMOL/L (ref 3–16)
BASOPHILS # BLD: 0.1 K/UL (ref 0–0.2)
BASOPHILS NFR BLD: 0.8 %
BUN SERPL-MCNC: 12 MG/DL (ref 7–20)
CALCIUM SERPL-MCNC: 9.4 MG/DL (ref 8.3–10.6)
CHLORIDE SERPL-SCNC: 103 MMOL/L (ref 99–110)
CO2 SERPL-SCNC: 25 MMOL/L (ref 21–32)
CREAT SERPL-MCNC: 0.6 MG/DL (ref 0.6–1.2)
DEPRECATED RDW RBC AUTO: 13.1 % (ref 12.4–15.4)
EOSINOPHIL # BLD: 0.2 K/UL (ref 0–0.6)
EOSINOPHIL NFR BLD: 2.3 %
GFR SERPLBLD CREATININE-BSD FMLA CKD-EPI: 89 ML/MIN/{1.73_M2}
GLUCOSE SERPL-MCNC: 103 MG/DL (ref 70–99)
HCT VFR BLD AUTO: 37.2 % (ref 36–48)
HGB BLD-MCNC: 12.7 G/DL (ref 12–16)
LYMPHOCYTES # BLD: 1.9 K/UL (ref 1–5.1)
LYMPHOCYTES NFR BLD: 20 %
MCH RBC QN AUTO: 31.8 PG (ref 26–34)
MCHC RBC AUTO-ENTMCNC: 34.1 G/DL (ref 31–36)
MCV RBC AUTO: 93.3 FL (ref 80–100)
MONOCYTES # BLD: 1.5 K/UL (ref 0–1.3)
MONOCYTES NFR BLD: 15.7 %
NEUTROPHILS # BLD: 6 K/UL (ref 1.7–7.7)
NEUTROPHILS NFR BLD: 61.2 %
PLATELET # BLD AUTO: 219 K/UL (ref 135–450)
PMV BLD AUTO: 9.6 FL (ref 5–10.5)
POTASSIUM SERPL-SCNC: 4.3 MMOL/L (ref 3.5–5.1)
RBC # BLD AUTO: 3.99 M/UL (ref 4–5.2)
SODIUM SERPL-SCNC: 138 MMOL/L (ref 136–145)
WBC # BLD AUTO: 9.7 K/UL (ref 4–11)

## 2025-01-13 PROCEDURE — 6370000000 HC RX 637 (ALT 250 FOR IP): Performed by: PHYSICAL MEDICINE & REHABILITATION

## 2025-01-13 PROCEDURE — 97530 THERAPEUTIC ACTIVITIES: CPT

## 2025-01-13 PROCEDURE — 97530 THERAPEUTIC ACTIVITIES: CPT | Performed by: PHYSICAL THERAPIST

## 2025-01-13 PROCEDURE — 97535 SELF CARE MNGMENT TRAINING: CPT

## 2025-01-13 PROCEDURE — 6360000002 HC RX W HCPCS: Performed by: PHYSICAL MEDICINE & REHABILITATION

## 2025-01-13 PROCEDURE — 97116 GAIT TRAINING THERAPY: CPT | Performed by: PHYSICAL THERAPIST

## 2025-01-13 PROCEDURE — 1280000000 HC REHAB R&B

## 2025-01-13 PROCEDURE — 80048 BASIC METABOLIC PNL TOTAL CA: CPT

## 2025-01-13 PROCEDURE — 85025 COMPLETE CBC W/AUTO DIFF WBC: CPT

## 2025-01-13 PROCEDURE — 6370000000 HC RX 637 (ALT 250 FOR IP): Performed by: STUDENT IN AN ORGANIZED HEALTH CARE EDUCATION/TRAINING PROGRAM

## 2025-01-13 PROCEDURE — 97110 THERAPEUTIC EXERCISES: CPT

## 2025-01-13 PROCEDURE — 36415 COLL VENOUS BLD VENIPUNCTURE: CPT

## 2025-01-13 PROCEDURE — 94760 N-INVAS EAR/PLS OXIMETRY 1: CPT

## 2025-01-13 PROCEDURE — 2500000003 HC RX 250 WO HCPCS: Performed by: PHYSICAL MEDICINE & REHABILITATION

## 2025-01-13 PROCEDURE — 97110 THERAPEUTIC EXERCISES: CPT | Performed by: PHYSICAL THERAPIST

## 2025-01-13 RX ORDER — SENNA AND DOCUSATE SODIUM 50; 8.6 MG/1; MG/1
1 TABLET, FILM COATED ORAL DAILY PRN
Status: DISCONTINUED | OUTPATIENT
Start: 2025-01-13 | End: 2025-01-18 | Stop reason: HOSPADM

## 2025-01-13 RX ADMIN — ASPIRIN 81 MG: 81 TABLET, COATED ORAL at 19:43

## 2025-01-13 RX ADMIN — ACETAMINOPHEN 650 MG: 325 TABLET ORAL at 22:09

## 2025-01-13 RX ADMIN — Medication 2000 UNITS: at 07:29

## 2025-01-13 RX ADMIN — TIMOLOL MALEATE 1 DROP: 5 SOLUTION OPHTHALMIC at 09:20

## 2025-01-13 RX ADMIN — ASPIRIN 81 MG: 81 TABLET, COATED ORAL at 07:29

## 2025-01-13 RX ADMIN — BRIMONIDINE TARTRATE 1 DROP: 2 SOLUTION OPHTHALMIC at 19:33

## 2025-01-13 RX ADMIN — PROMETHAZINE HYDROCHLORIDE 12.5 MG: 25 TABLET ORAL at 07:29

## 2025-01-13 RX ADMIN — TIMOLOL MALEATE 1 DROP: 5 SOLUTION OPHTHALMIC at 19:32

## 2025-01-13 RX ADMIN — LOSARTAN POTASSIUM 25 MG: 25 TABLET, FILM COATED ORAL at 19:43

## 2025-01-13 RX ADMIN — PROMETHAZINE HYDROCHLORIDE 12.5 MG: 25 TABLET ORAL at 16:11

## 2025-01-13 RX ADMIN — PROMETHAZINE HYDROCHLORIDE 12.5 MG: 25 TABLET ORAL at 22:09

## 2025-01-13 RX ADMIN — ACETAMINOPHEN 650 MG: 325 TABLET ORAL at 07:29

## 2025-01-13 RX ADMIN — ATORVASTATIN CALCIUM 20 MG: 20 TABLET, FILM COATED ORAL at 19:43

## 2025-01-13 RX ADMIN — Medication 3 MG: at 00:42

## 2025-01-13 RX ADMIN — BRIMONIDINE TARTRATE 1 DROP: 2 SOLUTION OPHTHALMIC at 09:20

## 2025-01-13 RX ADMIN — SODIUM CHLORIDE, PRESERVATIVE FREE 10 ML: 5 INJECTION INTRAVENOUS at 07:30

## 2025-01-13 RX ADMIN — ACETAMINOPHEN 650 MG: 325 TABLET ORAL at 16:08

## 2025-01-13 RX ADMIN — SODIUM CHLORIDE, PRESERVATIVE FREE 10 ML: 5 INJECTION INTRAVENOUS at 19:31

## 2025-01-13 RX ADMIN — ENOXAPARIN SODIUM 30 MG: 100 INJECTION SUBCUTANEOUS at 07:29

## 2025-01-13 ASSESSMENT — PAIN DESCRIPTION - ORIENTATION
ORIENTATION: RIGHT

## 2025-01-13 ASSESSMENT — PAIN DESCRIPTION - LOCATION
LOCATION: HIP
LOCATION: HIP;LEG
LOCATION: HIP

## 2025-01-13 ASSESSMENT — PAIN DESCRIPTION - FREQUENCY
FREQUENCY: CONTINUOUS
FREQUENCY: CONTINUOUS

## 2025-01-13 ASSESSMENT — PAIN SCALES - GENERAL
PAINLEVEL_OUTOF10: 0
PAINLEVEL_OUTOF10: 6
PAINLEVEL_OUTOF10: 2
PAINLEVEL_OUTOF10: 2
PAINLEVEL_OUTOF10: 6
PAINLEVEL_OUTOF10: 6

## 2025-01-13 ASSESSMENT — PAIN DESCRIPTION - ONSET
ONSET: GRADUAL
ONSET: ON-GOING

## 2025-01-13 ASSESSMENT — PAIN DESCRIPTION - DESCRIPTORS
DESCRIPTORS: ACHING;DISCOMFORT
DESCRIPTORS: ACHING
DESCRIPTORS: DISCOMFORT

## 2025-01-13 ASSESSMENT — PAIN DESCRIPTION - PAIN TYPE
TYPE: ACUTE PAIN
TYPE: ACUTE PAIN;SURGICAL PAIN

## 2025-01-13 ASSESSMENT — PAIN - FUNCTIONAL ASSESSMENT: PAIN_FUNCTIONAL_ASSESSMENT: ACTIVITIES ARE NOT PREVENTED

## 2025-01-13 NOTE — CARE COORDINATION
Chart reviewed.  Met with patient to introduce  role, initiate discussion regarding DC planning and to inform of weekly Team Conferences to review her progress.                                 SOCIAL WORK ASSESSMENT      GOAL:   to return home, alone, with support from family , neighbors and friends.       HOME SITUATION:  Pt lives alone, house with 3 steps entry with left railing.  It is one floor except laundry is in the basement. She has two small dogs at home who are being cared for by a nephew in [place].   She works 12 hurs a week at the [employer] and is very active with [employer], plays piano and likes to have luncheons with friends.    She reports she can rely upon two nieces and two nephews for any assistance.         Employer:    Works 12 hours a week for [employer].   Informed her most employers need paperwork to be completed for the employer and she will need to contact her manger to inquire.   If paperwork is necessary, it will need to be faxed or emailed to me from the Employer only in order for Dr Couch to complete. Additionally, it needs to be sent here prior to release.            Pcp:    Dr Smith.     She is active with Dr Smith but is switching to a new pcp that will be closer to her home.     Pharmacy:   [facility]        PRIOR LEVEL OF FUNCTIONING:       PERSONAL CARE:   ind                                                                        DRIVES:  ind                                                                     FINANCES:  ind                                                                     MEALS:       ind                                                                                             GROCERY SHOPS: indep      DME CURRENTLY AT HOME:Walk in shower with curtain, two bars in shower, cane.        CURRENT HOME CARE/SERVICES:  Informed her of possible post acute services such as home care or out pateint services to continue her

## 2025-01-13 NOTE — PROGRESS NOTES
[facility] Inpatient Rehabilitation Progress Note    Merari Cummins  1/12/2025  [id number]    Chief Complaint: Closed displaced intertrochanteric fracture of right femur, initial encounter ([facility])    Subjective:   Discussed patient pain, states tylenol is adequate for now, discussed if needed to complete therapy tramadol may be an option.    Objective:  Patient Vitals for the past 24 hrs:   BP Temp Temp src Pulse Resp SpO2 Weight   01/12/25 2018 (!) 173/84 98.6 °F (37 °C) Oral 75 -- 95 % --   01/12/25 0800 (!) 108/42 97.5 °F (36.4 °C) Oral 92 18 95 % --   01/12/25 0515 -- -- -- -- -- -- 55.3 kg (121 lb 14.6 oz)       Gen: No distress, pleasant.   HEENT: Normocephalic, atraumatic.   CV: Regular rate and rhythm.   Resp: No respiratory distress.   Abd: Soft, nontender   Ext: No edema.   Neuro: Alert, oriented, appropriately interactive.     Wt Readings from Last 3 Encounters:   01/12/25 55.3 kg (121 lb 14.6 oz)   01/10/25 54.6 kg (120 lb 5.9 oz)   02/04/20 59 kg (130 lb)       Laboratory data:   Lab Results   Component Value Date    WBC 11.0 01/11/2025    HGB 12.0 01/11/2025    HCT 36.1 01/11/2025    MCV 94.5 01/11/2025     01/11/2025       Lab Results   Component Value Date/Time     01/11/2025 06:00 AM    K 4.7 01/11/2025 06:00 AM     01/11/2025 06:00 AM    CO2 25 01/11/2025 06:00 AM    BUN 20 01/11/2025 06:00 AM    CREATININE 0.6 01/11/2025 06:00 AM    GLUCOSE 88 01/11/2025 06:00 AM    CALCIUM 8.9 01/11/2025 06:00 AM        Therapy progress:       PT    Supine to Sit:    Sit to Supine:     Sit to Stand:    Chair/Bed to Chair Transfer:    Car Transfer:    Ambulation 10 ft:    Ambulation 50 ft:    Ambulation 150 ft:    Stairs - 1 Step:    Stairs - 4 Step:    Stairs - 12 Step:      OT    Eating: Independent  Oral Hygiene: Setup or clean-up assistance  Bathing: Partial/moderate assistance  Upper Body Dressing: Setup or clean-up assistance  Lower Body Dressing: Substantial/maximal assistance  Toilet  Transfer: Substantial/maximal assistance  Toilet Hygiene: Partial/moderate assistance    Speech Therapy         ADULT DIET; Regular    Body mass index is 23.81 kg/m².    Assessment and Plan:    Impairments: decreased right hip ROM, balance      Right intertrochanteric femur fracture   -s/p hemiarthroplasty (1/9 with Dr. Jiang).   -Wound care per Ortho  -WBAT RLE with posterolateral hip precautions.   -PT/OT     Leukocytosis  -Likely reactive  -Monitor temp and WBC trend  -Monitor for localizing signs/symptoms of infection     Anemia  -Post-surgical +/- dilutional  -Monitor Hgb, transfuse prn <7.      Microscopic polyangiitis/MPO-ANCA vasculitis   -Renal and pulmonary involvement.   -Previously treated with Rituxan.   -Monitor renal function.     HTN, labile  -continue losartan with hold parameters   -Losartan decreased from BID to qhs 1/11/25     HLD  -continue atorvastatin     Bladder   -High risk retention   -Monitor PVRs, SC prn >400cc     Bowel   -High risk constipation   -senna+colace BID, PRN miralax, MoM, and bisacodyl supp.     Safety   -fall and aspiration precautions     Pain control  -patient prefers acetaminophen for pain control and does not like oxycodone   -will trial tramadol if acetaminophen insufficient     DVT ppx  -continue ASA 81 BID x 30 days post op per Ortho     FULL CODE    Santana Guerrero,    1/12/2025, 8:28 PM ,

## 2025-01-13 NOTE — PROGRESS NOTES
Department of Physical Medicine & Rehabilitation  Progress Note    Patient Identification:  Merari Cummins  [id number]  : 1940  Admit date: 1/10/2025    Chief Complaint: Closed displaced intertrochanteric fracture of right femur, initial encounter ([facility])    Subjective:   No acute events overnight.   Patient seen this am returning to recliner from bathroom with RN. Her balance and gait pattern are improving. Pain has been controlled with acetaminophen alone. She had some nausea this morning which improved with prn phenergan. She thinks nausea may be related to anxiety.   Labs reviewed.     ROS: No f/c, n/v, cp     Objective:  Patient Vitals for the past 24 hrs:   BP Temp Temp src Pulse Resp SpO2 Weight   25 0907 -- -- -- -- -- 93 % --   25 0727 (!) 155/81 98.5 °F (36.9 °C) Oral 77 16 95 % --   25 0500 -- -- -- -- -- -- 54.9 kg (121 lb 0.5 oz)   25 (!) 173/84 98.6 °F (37 °C) Oral 75 18 95 % --     Const: Alert. No distress, pleasant.   HEENT: Normocephalic, atraumatic. Normal sclera/conjunctiva. MMM.   CV: Regular rate and rhythm.   Resp: No respiratory distress. Lungs CTAB.   Abd: Soft, nontender, nondistended, NABS+   Ext: No edema.   MSK: decreased right hip ROM  Neuro: Alert, oriented, appropriately interactive.   Psych: Cooperative, appropriate mood and affect    Laboratory data: Available via EMR.   Last 24 hour lab  Recent Results (from the past 24 hour(s))   CBC with Auto Differential    Collection Time: 25  5:28 AM   Result Value Ref Range    WBC 9.7 4.0 - 11.0 K/uL    RBC 3.99 (L) 4.00 - 5.20 M/uL    Hemoglobin 12.7 12.0 - 16.0 g/dL    Hematocrit 37.2 36.0 - 48.0 %    MCV 93.3 80.0 - 100.0 fL    MCH 31.8 26.0 - 34.0 pg    MCHC 34.1 31.0 - 36.0 g/dL    RDW 13.1 12.4 - 15.4 %    Platelets 219 135 - 450 K/uL    MPV 9.6 5.0 - 10.5 fL    Neutrophils % 61.2 %    Lymphocytes % 20.0 %    Monocytes % 15.7 %    Eosinophils % 2.3 %    Basophils % 0.8 %    Neutrophils Absolute 6.0

## 2025-01-13 NOTE — PROGRESS NOTES
Occupational Therapy  Facility/Department: [address] REHAB  Rehabilitation Occupational Therapy Daily AM and PM Treatment Note    Date: 25  Patient Name: Merari Cummins       Room: J3K-6310/3256-01  MRN: [id number]  Account: [id number]   : 1940  (84 y.o.) Gender: female         Past Medical History:  has a past medical history of Cataracts, bilateral, HTN (hypertension), and Vasculitis (HCC).  Past Surgical History:   has a past surgical history that includes hip surgery (Right, 2025).    Restrictions  Restrictions/Precautions: Fall Risk, Weight Bearing, Aspiration Risk  Hip Precautions: Posterior hip precautions  Other Position/Activity Restrictions: No adduction, flexion beyond 90 degrees of operative hip. Keep \"toes to nabil\" in bed. Follow these restrictions for 3 months postoperatively or until restrictions are released from surgeon.  Right Lower Extremity Weight Bearing: Weight Bearing As Tolerated  Equipment Used:  (RW)    Subjective  Subjective: Pt met in dept, reports pain in R hip, rates 5/10 and only wants to take Tylenol, agreeable to OT treat. Pt recalls 2/3 hip restrictions, she asks for clarification of 90* restriction - was ed on and OT demonstrated throughout entire session.  Restrictions/Precautions: Fall Risk;Weight Bearing;Aspiration Risk             Objective     Cognition  Overall Cognitive Status: WFL  Orientation  Overall Orientation Status: Within Functional Limits         ADL  Putting On/Taking Off Footwear  Equipment Provided: Sock aid;Long-handle shoe horn  Assistance Level: Stand by assist;Verbal cues  Skilled Clinical Factors: Pt seated in WC doffs R shoe by kicking off with L. She dofs sock with shoe horn, increased time and mod effort. She was ed on use of sock aide, able to place sock on aide and placed on R foot with min effort, good maintance of precautions.  Toilet Transfers  Technique:  (amb RW)  Equipment:  (vanity on R)  Assistance Level: Stand by assist  Skilled

## 2025-01-13 NOTE — PROGRESS NOTES
Patient admitted to rehab with displaced right femoral neck fracture. Patient underwent right hemiarthroplasty (1/9 with Dr. Jiang).  A/Ox4. Transfers with assist x1 with walker and gaitbelt. Mobility restrictions: WBAT, posterolateral approach hip precautions. On regular diet, tolerating well. Medications taken whole with water. On aspirin and lovenox for DVT prophylaxis.  Skin: R hip surgical incision and bruising. Oxygen: n/a. LDA: PIV to L forearm. Flushed, alcohol cap applied. Has been contient of bowel and of bladder, but has had some intermittent incontinence per night nurse. LBM 01/13/25. Chair/bed alarms in use and call light in reach. Will monitor for safety.     Patient c/o pain and nausea this morning. Patient feels that nausea is related to anxiety. Patient medicated with PRN phenergan and Tylenol, see eMAR for details.     Electronically signed by Cheryle Hill RN on 1/13/2025 at 8:45 AM

## 2025-01-13 NOTE — PLAN OF CARE
Problem: Safety - Adult  Goal: Free from fall injury  1/13/2025 0911 by Cheryle Hill, RN  Outcome: Progressing     Problem: Skin/Tissue Integrity  Goal: Absence of new skin breakdown  Description: 1.  Monitor for areas of redness and/or skin breakdown  2.  Assess vascular access sites hourly  3.  Every 4-6 hours minimum:  Change oxygen saturation probe site  4.  Every 4-6 hours:  If on nasal continuous positive airway pressure, respiratory therapy assess nares and determine need for appliance change or resting period.  1/13/2025 0911 by Cheryle Hill, RN  Outcome: Progressing     Problem: ABCDS Injury Assessment  Goal: Absence of physical injury  1/13/2025 0911 by Cheryle Hill RN  Outcome: Progressing     Problem: Musculoskeletal - Adult  Goal: Return mobility to safest level of function  1/13/2025 0911 by Cheryle Hill, RN  Outcome: Progressing     Problem: Pain  Goal: Verbalizes/displays adequate comfort level or baseline comfort level  1/13/2025 0911 by Cheryle Hill, RN  Outcome: Progressing

## 2025-01-13 NOTE — PATIENT CARE CONFERENCE
[facility]  Inpatient Rehabilitation  Weekly Team Conference Note      Date: 2025  Patient Name:  Merari Cummins    MRN: [id number]  : 1940  Gender: Female  Physician: Dr. Khoa HOYOS  Diagnosis: Closed displaced intertrochanteric fracture of right femur, initial encounter ([facility]) [S72.141A]    CASE MANAGEMENT  Assessment: Pt lives alone, has family and friend support, works part time, very active socially, goal is home and agreeable to post acute services.        PHYSICAL THERAPY    Bed Mobility:  Overall Assistance Level: Stand By Assist  Additional Factors: Increased time to complete  Sit>supine:  Assistance Level: Stand by assist  Supine>sit:  Assistance Level: Stand by assist    Transfers:  Surface: Wheelchair, To chair without arms, From chair without arms  Additional Factors: Set-up, Verbal cues, Hand placement cues, Increased time to complete  Device: Walker (RW)  Sit>stand:  Assistance Level: Stand by assist, Contact guard assist  Stand>sit:  Assistance Level: Stand by assist, Contact guard assist  Bed<>chair     Stand Pivot:     Lateral transfer:     Car transfer:  Assistance Level: Contact guard assist, Stand by assist  Skilled Clinical Factors: Cues for hand placement    Ambulation:  Surface: Level surface  Device: Rolling walker  Distance: 80' x2  Activity: Within Unit  Additional Factors: Set-up, Verbal cues, Increased time to complete  Assistance Level: Contact guard assist, Stand by assist  Gait Deviations: Slow bjorn, Decreased step length left, Decreased weight shift right, Decreased heel strike left  Skilled Clinical Factors: Antalgic step to gait-more step through the more she walks    Stairs:  Stair Height: 6''  Device: Rolling walker  Number of Stairs: 8  Additional Factors: Set-up, Verbal cues, Non-reciprocal going up, Non-reciprocal going down, Increased time to complete  Assistance Level: Contact guard assist, Stand by assist  Skilled Clinical Factors: Cues for  _______________________________  Equipment recommendations:  [] Hospital bed [x] Tub bench  [] Shower chair [] Hand held shower  [] Raised toilet seat [] Toilet safety frame [] Bedside commode   [] W/C: _____  [] Rolling Walker [] Standard walker [] Gait belt [] cane: _________  [] Sliding board [] Alternate seating/furniture [] O2 [x] Hip Kit: _______  [] Life Line [] Other: _______  Factors facilitating achievement of predicted outcomes: Family support, Friend support, Motivated, Cooperative, and Pleasant  Barriers to the achievement of predicted outcomes/Interventions: Pain, lives alone, 3 MISTI 1 rail, basement laundry, pain,anxiety, post/lat hip precautions        Interdisciplinary Individualized Plan of Care Review:    Continue Current Plan of Care: Yes    Modifications:_____________________________    Special Needs in the Upcoming Week :    [] Family/Caregiver Education  [] Home visit  []Therapeutic Pass   [] Consults:_______    [] Other;_______    Patient Rehab Team Goals for the Upcoming Week:  1. Amb household distances with RW and MI  2. Pt will complete toileting Mod I  3.           Team Members Present at Conference:  Physician:Dr. Khoa HOYOS  : NENA Cheung    Occupational Therapist: Marjorie Jacobs, OTR/L  Physical Therapist:Tuyet Chaeny, MPT CNS [id number]  Speech Therapist:   ARU Supervisor:Nneka Ivan RN CRRN  Dietitian: Juana Albarran RD, LD  Psychologist:  Other:      I attest to leading the interdisciplinary team meeting, required attendees are present as listed above. I approve the established interdisciplinary plan of care as documented within the medical record of Merari Cummins.    MD: Marilou Couch MD 1/14/2025, 1:16 PM

## 2025-01-13 NOTE — FLOWSHEET NOTE
Patient admitted to ARU with dx of closed fracture of right hip,(R Hip Hemiarthroplasty) sustained fracture during a fall in her kitchen.  A/Ox4. Transfers with rolling walker and gait belt CGA x11. Mobility restrictions: WBAT, posterolateral approach hip precautions. On Regular diet, tolerating well. Medications taken whole with thin liquids. On Aspirin and Lovenox for DVT prophylaxis.  Skin: surgical incision right hip w/Prineo dressing intact, skin tear R elbow, Mepilex intact. Oxygen: RA. LDA: PIV - on  LFA flushed for patency. Has been continent of bowel and usually of bladder. LBM 1/12/2025. Chair/bed alarms in use and call light within reach.

## 2025-01-13 NOTE — PROGRESS NOTES
Physical Therapy  Facility/Department: [address] REHAB  Rehabilitation Physical Therapy Treatment Note    NAME: Merari Cummins  : 1940 (84 y.o.)  MRN: [id number]  CODE STATUS: Full Code    Date of Service: 25       Restrictions:  Restrictions/Precautions: Fall Risk, Weight Bearing, Aspiration Risk  Lower Extremity Weight Bearing Restrictions  Right Lower Extremity Weight Bearing: Weight Bearing As Tolerated  Position Activity Restriction  Hip Precautions: Posterior hip precautions  Other Position/Activity Restrictions: No adduction, flexion beyond 90 degrees of operative hip. Keep \"toes to nabil\" in bed. Follow these restrictions for 3 months postoperatively or until restrictions are released from surgeon.     SUBJECTIVE  Subjective: Pt reports she didn't sleep well last night.  Pain: Pain 6/10 R lat hip.       OBJECTIVE  Cognition  Overall Cognitive Status: WFL  Orientation  Overall Orientation Status: Within Functional Limits    Functional Mobility  Bed Mobility  Overall Assistance Level: Stand By Assist  Additional Factors: Increased time to complete  Bridging  Assistance Level: Stand by assist  Roll Left  Assistance Level: Stand by assist  Roll Right  Assistance Level: Stand by assist  Sit to Supine  Assistance Level: Stand by assist  Supine to Sit  Assistance Level: Stand by assist  Scooting  Assistance Level: Stand by assist  Balance  Sitting Balance: Stand by assistance  Standing Balance: Stand by assistance (RW)  Transfers  Surface: Wheelchair;To chair without arms;From chair without arms  Additional Factors: Set-up;Verbal cues;Hand placement cues;Increased time to complete  Device: Walker (RW)  Sit to Stand  Assistance Level: Stand by assist;Contact guard assist  Stand to Sit  Assistance Level: Stand by assist;Contact guard assist  Car Transfer  Assistance Level: Contact guard assist;Stand by assist  Skilled Clinical Factors: Cues for hand placement      Environmental Mobility  Ambulation  Surface:  Exercises  A/AROM Exercises: Supine APs,Quad sets, glut sets, HS sets, ADD sets, SAQ, heel slides, ABD/ADD, SLR, sitting marches, LAQ x20 reps each LE.  For HEP:Visit [email or web address] Access Code: [email or web address]  Postural Correction Exercises: Ball 5 way x10 reps pt given written hand out        ASSESSMENT/PROGRESS TOWARDS GOALS       Assessment  Assessment: Patient is an 85 yo female with pmh HTN, HLD, and microscopic polyangiitis with pulmonary and renal involvement who initially presented on 1/9/2025 with right hip pain after a fall. She fell approximately 12 hours before coming into the ER. States that she got her slipper caught on loose dustin resulting in fall. Denies head trauma or loss of consciousness. Initially able to ambulate but then hip became progressively more painful and she was unable to bear weight on it. Diagnostic work-up revealed displaced right femoral neck fracture. Patient underwent right hemiarthroplasty (1/9 with Dr. Jiang). Now WBAT RLE with posterlateral hip precautions. Course complicated by leukocytosis, anemia, labile blood pressure. Now presents to ARU with impaired mobility and self-care below her baseline. Currently, patient reports mild-moderate right hip pain. Worse with movement and weight bearing. Improves with rest and acetaminophen. She also has more mild right shoulder pain. She denies any sensory changes. Pt lives alone in a 1 1/2 story home with basement laundry. She ststes she enters the front of the home where there are 3STE with LHR. She was able to perform bed mobility, transfers and amb household distances with CG/SBA. She could negotiate the stairs with BHR and curb with RW and CGA, she was a little anxious. She would benefit from skilled PT on our ARU for strengthening, bed mobility, gait, transfers and stairs so she could go home alone safely.  Activity Tolerance: Patient tolerated evaluation without incident  Discharge Recommendations: Continue to assess

## 2025-01-13 NOTE — CONSULTS
Comprehensive Nutrition Assessment    Type and Reason for Visit:  Initial, Consult    Nutrition Recommendations/Plan:   Continue regular diet     Malnutrition Assessment:  Malnutrition Status:  No malnutrition (01/13/25 0833)    Context:  Acute Illness     Findings of the 6 clinical characteristics of malnutrition:  Energy Intake:  Mild decrease in energy intake  Weight Loss:  No weight loss     Body Fat Loss:  No body fat loss     Muscle Mass Loss:  No muscle mass loss    Fluid Accumulation:  No fluid accumulation     Strength:  Normal  strength    Nutrition Assessment:    Consult per ARU protocol. PMH includes HTN. Pt adm for therapy following a right hip fracture. Noted surgery on 1/9/25. Diet adv to regular. Pt reported a little nausea this am, but tolerance of meals has been %. Feedback revealed that meals are a little richer and heavier than at home. Declined need for ONS at this time. When asked about wt, pt reported a stable status over the past few years. Pt with no nutrition issues at this time. Will continue to follow progress.    Nutrition Related Findings:    Labs reviewed. Noted BM on 1/13. Noted Vitamin D on board. Noted no edema. Wound Type: Skin Tears, Surgical Incision (skin tear right elbow and SI to right hip with no issues noted)       Current Nutrition Intake & Therapies:    Average Meal Intake: %     ADULT DIET; Regular    Anthropometric Measures:  Height: 152.4 cm (5')  Ideal Body Weight (IBW): 100 lbs (45 kg)    Admission Body Weight: 53.5 kg (118 lb)  Current Body Weight: 54.9 kg (121 lb),   IBW. Weight Source: Bed scale  Current BMI (kg/m2): 23.6                             BMI Categories: Normal Weight (BMI 22.0 to 24.9) age over 65    Estimated Daily Nutrient Needs:        Energy (kcal/day): 6880-8506 (28-30 x ABW 55 kg)     Protein (g/day): 66-77 (1.2-1.4 x ABW 55 kg)  Method Used for Fluid Requirements: 1 ml/kcal  Fluid (ml/day):      Nutrition Diagnosis:    Increased nutrient needs related to increase demand for energy/nutrients as evidenced by rehab for strength and conditioning    Nutrition Interventions:   Food and/or Nutrient Delivery: Continue Current Diet  Nutrition Education/Counseling: Education/Counseling not indicated  Coordination of Nutrition Care: Continue to monitor while inpatient       Goals:  Goals: PO intake 75% or greater  Type of Goal: New goal       Nutrition Monitoring and Evaluation:   Behavioral-Environmental Outcomes: None Identified  Food/Nutrient Intake Outcomes: Food and Nutrient Intake  Physical Signs/Symptoms Outcomes: Biochemical Data, Constipation, Diarrhea, Nausea or Vomiting, Fluid Status or Edema, Skin, Weight    Discharge Planning:    Continue current diet     Louie Orantes RD, LD  Contact: [phone]

## 2025-01-13 NOTE — PLAN OF CARE
Problem: Discharge Planning  Goal: Discharge to home or other facility with appropriate resources  1/12/2025 2322 by Melissa Pedroza RN  Outcome: Progressing  1/12/2025 1040 by Griselda Garcia RN  Outcome: Progressing     Problem: Safety - Adult  Goal: Free from fall injury  1/12/2025 2322 by Melissa Pedroza RN  Outcome: Progressing  1/12/2025 1040 by Griselda Garcia RN  Outcome: Progressing     Problem: Skin/Tissue Integrity  Goal: Absence of new skin breakdown  Description: 1.  Monitor for areas of redness and/or skin breakdown  2.  Assess vascular access sites hourly  3.  Every 4-6 hours minimum:  Change oxygen saturation probe site  4.  Every 4-6 hours:  If on nasal continuous positive airway pressure, respiratory therapy assess nares and determine need for appliance change or resting period.  1/12/2025 2322 by Melissa Pedroza RN  Outcome: Progressing  1/12/2025 1040 by Griselda Garcia RN  Outcome: Progressing     Problem: ABCDS Injury Assessment  Goal: Absence of physical injury  1/12/2025 2322 by Melissa Pedroza RN  Outcome: Progressing  1/12/2025 1040 by Griselda Garcia RN  Outcome: Progressing     Problem: Musculoskeletal - Adult  Goal: Return mobility to safest level of function  1/12/2025 2322 by Melissa Pedroza RN  Outcome: Progressing  1/12/2025 1040 by Griselda Garcia RN  Outcome: Progressing  Goal: Maintain proper alignment of affected body part  1/12/2025 2322 by Melissa Pedroza RN  Outcome: Progressing  1/12/2025 1040 by Griselda Garcia RN  Outcome: Progressing  Goal: Return ADL status to a safe level of function  1/12/2025 2322 by Melissa Pedroza RN  Outcome: Progressing  1/12/2025 1040 by Griselda Garcia RN  Outcome: Progressing     Problem: Pain  Goal: Verbalizes/displays adequate comfort level or baseline comfort level  1/12/2025 2322 by Melissa Pedroza RN  Outcome: Progressing  1/12/2025 1040 by Griselda Garcia RN  Outcome: Progressing    Please call patient with appointments

## 2025-01-13 NOTE — DISCHARGE INSTR - COC
Continuity of Care Form    Patient Name: Merari Cummins   :  1940  MRN:  [id number]    Admit date:  1/10/2025  Discharge date:  2025  Code Status Order: Full Code   Advance Directives:   Advance Care Flowsheet Documentation             Admitting Physician:  Marilou Couch MD  PCP: Jaquan Smith MD    Discharging Nurse: Griselda SPENCER, BSN  Discharging Hospital Unit/Room#: E2D-0681/3256-01  Discharging Unit Phone Number: [phone]    Emergency Contact:   Extended Emergency Contact Information  Primary Emergency Contact: Lacie Khan  Home Phone: [phone]  Mobile Phone: [phone]  Relation: Niece/Nephew  Secondary Emergency Contact: Dorothy Palm  Mobile Phone: [phone]  Relation: Niece/Nephew  Preferred language: English    Past Surgical History:  Past Surgical History:   Procedure Laterality Date    HIP SURGERY Right 2025    RIGHT HIP HEMIARTHROPLASTY performed by Ramy Jiang MD at [facility]       Immunization History:   Immunization History   Administered Date(s) Administered    COVID-19, MODERNA BLUE border, Primary or Immunocompromised, (age 12y+), IM, 100 mcg/0.5mL 2021, 2021    COVID-19, MODERNA PURPLE border, (age 18y+ booster, 6y-11y series), IM, 50 mcg/0.5 mL 10/30/2021, 2022, 2023    COVID-19, PFIZER Bivalent, DO NOT Dilute, (age 12y+), IM, 30 mcg/0.3 mL 10/19/2022    COVID-19, PFIZER, (age 12y+), IM, 30mcg/0.3mL 2024       Active Problems:  Patient Active Problem List   Diagnosis Code    Closed fracture of right distal radius S52.501A    Intertrochanteric fracture of right femur, closed, initial encounter ([facility]) S72.141A    Closed fracture of right hip ([facility]) S72.001A    Closed displaced intertrochanteric fracture of right femur, initial encounter ([facility]) S72.141A       Isolation/Infection:   Isolation            No Isolation          Patient Infection Status       None to display            Nurse Assessment:  Last Vital Signs: BP (!) 155/81

## 2025-01-14 PROCEDURE — 94760 N-INVAS EAR/PLS OXIMETRY 1: CPT

## 2025-01-14 PROCEDURE — 1280000000 HC REHAB R&B

## 2025-01-14 PROCEDURE — 97530 THERAPEUTIC ACTIVITIES: CPT | Performed by: PHYSICAL THERAPIST

## 2025-01-14 PROCEDURE — 97110 THERAPEUTIC EXERCISES: CPT | Performed by: PHYSICAL THERAPIST

## 2025-01-14 PROCEDURE — 6370000000 HC RX 637 (ALT 250 FOR IP): Performed by: PHYSICAL MEDICINE & REHABILITATION

## 2025-01-14 PROCEDURE — 2500000003 HC RX 250 WO HCPCS: Performed by: PHYSICAL MEDICINE & REHABILITATION

## 2025-01-14 PROCEDURE — 97535 SELF CARE MNGMENT TRAINING: CPT

## 2025-01-14 PROCEDURE — 97116 GAIT TRAINING THERAPY: CPT | Performed by: PHYSICAL THERAPIST

## 2025-01-14 PROCEDURE — 6360000002 HC RX W HCPCS: Performed by: PHYSICAL MEDICINE & REHABILITATION

## 2025-01-14 PROCEDURE — 6370000000 HC RX 637 (ALT 250 FOR IP): Performed by: STUDENT IN AN ORGANIZED HEALTH CARE EDUCATION/TRAINING PROGRAM

## 2025-01-14 RX ADMIN — SODIUM CHLORIDE, PRESERVATIVE FREE 10 ML: 5 INJECTION INTRAVENOUS at 09:19

## 2025-01-14 RX ADMIN — BRIMONIDINE TARTRATE 1 DROP: 2 SOLUTION OPHTHALMIC at 09:22

## 2025-01-14 RX ADMIN — ACETAMINOPHEN 650 MG: 325 TABLET ORAL at 16:10

## 2025-01-14 RX ADMIN — PROMETHAZINE HYDROCHLORIDE 12.5 MG: 25 TABLET ORAL at 09:18

## 2025-01-14 RX ADMIN — PROMETHAZINE HYDROCHLORIDE 12.5 MG: 25 TABLET ORAL at 22:18

## 2025-01-14 RX ADMIN — ATORVASTATIN CALCIUM 20 MG: 20 TABLET, FILM COATED ORAL at 20:16

## 2025-01-14 RX ADMIN — ASPIRIN 81 MG: 81 TABLET, COATED ORAL at 20:16

## 2025-01-14 RX ADMIN — ENOXAPARIN SODIUM 30 MG: 100 INJECTION SUBCUTANEOUS at 09:17

## 2025-01-14 RX ADMIN — TIMOLOL MALEATE 1 DROP: 5 SOLUTION OPHTHALMIC at 20:09

## 2025-01-14 RX ADMIN — ASPIRIN 81 MG: 81 TABLET, COATED ORAL at 09:17

## 2025-01-14 RX ADMIN — ACETAMINOPHEN 650 MG: 325 TABLET ORAL at 22:18

## 2025-01-14 RX ADMIN — SODIUM CHLORIDE, PRESERVATIVE FREE 10 ML: 5 INJECTION INTRAVENOUS at 20:09

## 2025-01-14 RX ADMIN — LOSARTAN POTASSIUM 25 MG: 25 TABLET, FILM COATED ORAL at 20:16

## 2025-01-14 RX ADMIN — Medication 2000 UNITS: at 09:18

## 2025-01-14 RX ADMIN — BRIMONIDINE TARTRATE 1 DROP: 2 SOLUTION OPHTHALMIC at 20:09

## 2025-01-14 RX ADMIN — TIMOLOL MALEATE 1 DROP: 5 SOLUTION OPHTHALMIC at 09:22

## 2025-01-14 RX ADMIN — ACETAMINOPHEN 650 MG: 325 TABLET ORAL at 09:17

## 2025-01-14 ASSESSMENT — PAIN SCALES - GENERAL
PAINLEVEL_OUTOF10: 6
PAINLEVEL_OUTOF10: 2
PAINLEVEL_OUTOF10: 2
PAINLEVEL_OUTOF10: 0
PAINLEVEL_OUTOF10: 6
PAINLEVEL_OUTOF10: 6

## 2025-01-14 ASSESSMENT — PAIN DESCRIPTION - ONSET
ONSET: GRADUAL
ONSET: ON-GOING

## 2025-01-14 ASSESSMENT — PAIN DESCRIPTION - LOCATION
LOCATION: INCISION;HIP
LOCATION: HIP
LOCATION: HIP;LEG

## 2025-01-14 ASSESSMENT — PAIN DESCRIPTION - DESCRIPTORS
DESCRIPTORS: BURNING
DESCRIPTORS: BURNING
DESCRIPTORS: DISCOMFORT

## 2025-01-14 ASSESSMENT — PAIN DESCRIPTION - ORIENTATION
ORIENTATION: RIGHT

## 2025-01-14 ASSESSMENT — PAIN - FUNCTIONAL ASSESSMENT: PAIN_FUNCTIONAL_ASSESSMENT: ACTIVITIES ARE NOT PREVENTED

## 2025-01-14 ASSESSMENT — PAIN DESCRIPTION - PAIN TYPE
TYPE: SURGICAL PAIN;ACUTE PAIN
TYPE: ACUTE PAIN

## 2025-01-14 ASSESSMENT — PAIN DESCRIPTION - FREQUENCY
FREQUENCY: INTERMITTENT
FREQUENCY: INTERMITTENT

## 2025-01-14 NOTE — PLAN OF CARE
Problem: Discharge Planning  Goal: Discharge to home or other facility with appropriate resources  1/14/2025 0850 by Cheryle Hill RN  Outcome: Progressing     Problem: Safety - Adult  Goal: Free from fall injury  1/14/2025 0850 by Cheryle Hill RN  Outcome: Progressing     Problem: Skin/Tissue Integrity  Goal: Absence of new skin breakdown  Description: 1.  Monitor for areas of redness and/or skin breakdown  2.  Assess vascular access sites hourly  3.  Every 4-6 hours minimum:  Change oxygen saturation probe site  4.  Every 4-6 hours:  If on nasal continuous positive airway pressure, respiratory therapy assess nares and determine need for appliance change or resting period.  1/14/2025 0850 by Cheryle Hill RN  Outcome: Progressing     Problem: ABCDS Injury Assessment  Goal: Absence of physical injury  1/14/2025 0850 by Cheryle Hill RN  Outcome: Progressing     Problem: Musculoskeletal - Adult  Goal: Return mobility to safest level of function  1/14/2025 0850 by Cheryle Hill RN  Outcome: Progressing     Problem: Musculoskeletal - Adult  Goal: Maintain proper alignment of affected body part  1/14/2025 0850 by Cheryle Hill RN  Outcome: Progressing     Problem: Pain  Goal: Verbalizes/displays adequate comfort level or baseline comfort level  1/14/2025 0850 by Cheryle Hill RN  Outcome: Progressing

## 2025-01-14 NOTE — PROGRESS NOTES
Department of Physical Medicine & Rehabilitation  Progress Note    Patient Identification:  Merari Cummins  [id number]  : 1940  Admit date: 1/10/2025    Chief Complaint: Closed displaced intertrochanteric fracture of right femur, initial encounter ([facility])    Subjective:   No acute events overnight.   Patient seen this am sitting up in room. Reports making progress with therapies. Pain controlled. Slept better overnight. We discussed dc planning and she is in agreement.   Labs reviewed.     ROS: No f/c, n/v, cp     Objective:  Patient Vitals for the past 24 hrs:   BP Temp Temp src Pulse Resp SpO2 Weight   25 1108 -- -- -- -- -- 96 % --   25 0915 126/80 97.5 °F (36.4 °C) Oral 94 18 97 % --   25 0608 -- -- -- -- -- -- 53 kg (116 lb 13.5 oz)   25 (!) 147/78 97.8 °F (36.6 °C) Oral 90 17 95 % --     Const: Alert. No distress, pleasant.   HEENT: Normocephalic, atraumatic. Normal sclera/conjunctiva. MMM.   CV: Regular rate and rhythm.   Resp: No respiratory distress. Lungs CTAB.   Abd: Soft, nontender, nondistended, NABS+   Ext: Mild RLE edema  MSK: decreased right hip ROM  Neuro: Alert, oriented, appropriately interactive.   Psych: Cooperative, appropriate mood and affect    Laboratory data: Available via EMR.   Last 24 hour lab  No results found for this or any previous visit (from the past 24 hour(s)).      Therapy progress:  Physical therapy:  Bed Mobility:  Overall Assistance Level: Stand By Assist  Additional Factors: Increased time to complete  Sit>supine:  Assistance Level: Stand by assist  Supine>sit:  Assistance Level: Stand by assist  Transfers:  Surface: Wheelchair  Additional Factors: Set-up, Verbal cues, Hand placement cues, Increased time to complete  Device: Walker (RW)  Sit>stand:  Assistance Level: Stand by assist  Stand>sit:  Assistance Level: Stand by assist  Bed<>chair     Stand Pivot:     Lateral transfer:     Car transfer:  Assistance Level: Contact guard assist, Stand by  assist  Skilled Clinical Factors: Cues for hand placement  Ambulation:  Surface: Level surface  Device: Rolling walker  Distance: 150' with 2 turns  Activity: Within Unit  Additional Factors: Increased time to complete  Assistance Level: Stand by assist  Gait Deviations: Slow bjorn, Decreased step length left, Decreased weight shift right, Decreased heel strike left  Skilled Clinical Factors: Step through gait good foot placement on turns  Stairs:  Stair Height: 6''  Device: Rolling walker  Number of Stairs: 8  Additional Factors: Non-reciprocal going up, Non-reciprocal going down, Increased time to complete  Assistance Level: Contact guard assist, Stand by assist  Skilled Clinical Factors: Cues for sequence  Curb:  Curb Height: 6''  Device: Rolling walker  Number of Curbs: 1  Additional Factors: Increased time to complete, Verbal cues  Assistance Level: Contact guard assist, Stand by assist  Skilled Clinical Factors: Cues for sequence  Wheelchair:       Occupational therapy:   Feeding     Grooming/Oral Hygiene  Assistance Level: Modified independent  Skilled Clinical Factors: seated at sink in w/c IND to brush teeth/hair  UE Bathing  Equipment Provided: Long-handled sponge  Assistance Level: Set-up  Skilled Clinical Factors: OT covered IV & was kept dry, bathed on shower chair, OT re-ed on hand hled shower & controls; pt completed w/ set-up including hair/back  LE Bathing  Equipment Provided: Long-handled sponge  Assistance Level: Verbal cues, Minimal assistance  Skilled Clinical Factors: OT ed in use of LH sponge for below knees, slignt min A to dry between toes, stood to grab bar SBA to bathe own chris-anal area  UE Dressing  Assistance Level: Set-up  Skilled Clinical Factors: Own set-up SBA from RW, min cues to carry clothes over RW, then OT handed pt shirt while seated in shower  LE Dressing  Equipment Provided: Reachers  Assistance Level: Set-up, Stand by assist, Verbal cues, Increased time to

## 2025-01-14 NOTE — PROGRESS NOTES
Patient admitted to rehab with displaced right femoral neck fracture. Patient underwent right hemiarthroplasty (1/9 with Dr. Jiang).  A/Ox4. Transfers with assist x1 with walker and gaitbelt. Mobility restrictions: WBAT, posterolateral approach hip precautions. On regular diet, tolerating well. Medications taken whole with water. On aspirin and lovenox for DVT prophylaxis.  Skin: R hip surgical incision and bruising. Skin tear to R elbow. Cleansed and new dressing applied. Mepitel, guaze, and coban. Oxygen: n/a. LDA: PIV to L forearm. Flushed, alcohol cap applied. Has been contient of bowel and of bladder. LBM 01/14/25; reported by OT. Chair/bed alarms in use and call light in reach. Will monitor for safety.     Electronically signed by Cheryle Hill RN on 1/14/2025 at 10:41 AM

## 2025-01-14 NOTE — PROGRESS NOTES
Physical Therapy  Facility/Department: [address] REHAB  Rehabilitation Physical Therapy Treatment Note    NAME: Merari Cummins  : 1940 (84 y.o.)  MRN: [id number]  CODE STATUS: Full Code    Date of Service: 25       Restrictions:  Restrictions/Precautions: Fall Risk, Weight Bearing, Aspiration Risk  Lower Extremity Weight Bearing Restrictions  Right Lower Extremity Weight Bearing: Weight Bearing As Tolerated  Position Activity Restriction  Hip Precautions: Posterior hip precautions  Other Position/Activity Restrictions: No adduction, flexion beyond 90 degrees of operative hip. Keep \"toes to nabil\" in bed. Follow these restrictions for 3 months postoperatively or until restrictions are released from surgeon.     SUBJECTIVE  Subjective: Pt reports she got a decent nights sleep so feels better.  Pain: Pain 6/10 R lat hip.       OBJECTIVE  Cognition  Overall Cognitive Status: [facility]  Cognition Comment: Could name 2 of 3 hip precautions-needed a cue for the third.  Orientation  Overall Orientation Status: Within Functional Limits    Functional Mobility  Balance  Standing Balance: Stand by assistance (RW)  Transfers  Surface: Wheelchair  Additional Factors: Set-up;Verbal cues;Hand placement cues;Increased time to complete  Device: Walker (RW)  Sit to Stand  Assistance Level: Stand by assist  Stand to Sit  Assistance Level: Stand by assist      Environmental Mobility  Ambulation  Surface: Level surface  Device: Rolling walker  Distance: 150' with 2 turns  Activity: Within Unit  Additional Factors: Increased time to complete  Assistance Level: Stand by assist  Gait Deviations: Slow bjorn;Decreased step length left;Decreased weight shift right;Decreased heel strike left  Skilled Clinical Factors: Step through gait good foot placement on turns  Stairs  Stair Height: 6''  Device: Bilateral handrails  Number of Stairs: 8  Additional Factors: Non-reciprocal going up;Non-reciprocal going down;Increased time to

## 2025-01-14 NOTE — PROGRESS NOTES
Occupational Therapy  Facility/Department: [address] REHAB  Rehabilitation Occupational Therapy Daily Treatment Note  AM and PM Sessions:     Date: 25  Patient Name: Merari Cummins       Room: W8L-0391/3256-01  MRN: [id number]  Account: [id number]   : 1940  (84 y.o.) Gender: female                    Past Medical History:  has a past medical history of Cataracts, bilateral, HTN (hypertension), and Vasculitis (HCC).  Past Surgical History:   has a past surgical history that includes hip surgery (Right, 2025).    Restrictions  Restrictions/Precautions: Fall Risk, Weight Bearing, Aspiration Risk  Hip Precautions: Posterior hip precautions  Other Position/Activity Restrictions: No adduction, flexion beyond 90 degrees of operative hip. Keep \"toes to nabil\" in bed. Follow these restrictions for 3 months postoperatively or until restrictions are released from surgeon.  Right Lower Extremity Weight Bearing: Weight Bearing As Tolerated  Equipment Used:  (RW)    Subjective  Subjective: Pt met in room seated in recliner agreeable to ADL session for shower. Rated her R hip pain 6/10, called nurse to bring Tylenol after shower.  Restrictions/Precautions: Fall Risk;Weight Bearing;Aspiration Risk             Objective     Cognition  Overall Cognitive Status: WFL  Orientation  Overall Orientation Status: Within Functional Limits         ADL  Grooming/Oral Hygiene  Assistance Level: Modified independent  Skilled Clinical Factors: seated at sink in w/c IND to brush teeth/hair  Upper Extremity Bathing  Equipment Provided: Long-handled sponge  Assistance Level: Set-up  Skilled Clinical Factors: OT covered IV & was kept dry, bathed on shower chair, OT re-ed on hand hled shower & controls; pt completed w/ set-up including hair/back  Lower Extremity Bathing  Equipment Provided: Long-handled sponge  Assistance Level: Verbal cues;Minimal assistance  Skilled Clinical Factors: OT ed in use of LH sponge for below knees, slignt min

## 2025-01-14 NOTE — PLAN OF CARE
Problem: Skin/Tissue Integrity  Goal: Absence of new skin breakdown  Description: 1.  Monitor for areas of redness and/or skin breakdown  2.  Assess vascular access sites hourly  3.  Every 4-6 hours minimum:  Change oxygen saturation probe site  4.  Every 4-6 hours:  If on nasal continuous positive airway pressure, respiratory therapy assess nares and determine need for appliance change or resting period.  Outcome: Progressing  Note: Able to change positions in bed without assist, no evidence of skin breakdown noted. Waffle cushion in place to chair.      Problem: Pain  Goal: Verbalizes/displays adequate comfort level or baseline comfort level  Outcome: Progressing  Flowsheets (Taken 1/14/2025 9027)  Verbalizes/displays adequate comfort level or baseline comfort level:   Encourage patient to monitor pain and request assistance   Administer analgesics based on type and severity of pain and evaluate response   Consider cultural and social influences on pain and pain management  Note: Able to rate pain using a 1-10 scale, medicated per prn orders, see MAR. Able to verbalize a reduction in pain and/or able to fall asleep and remain asleep without any s/s of pain

## 2025-01-14 NOTE — CARE COORDINATION
Team Conference held today.  Team reviewed barriers: pain, anxiety, hip precautions, nausea.  DME recs:   wh walker, TTB, 3 piece hip kit.  Team recommends DC on Saturday, 1- with home care orders for SN/PT/OT.    Met with pt to review.  She is agreeable to home care and has already chosen [facility].  She is agreeable to getting wh walker from [employer] but family has already ordered the TTB and 3 piece hip kit from [employer].    Family will transport her home.    Did receive [place] paperwork from her employer. Have put on Dr Couch's desk.    NENA Cheung     Case Management   [phone]    1/14/2025  4:57 PM

## 2025-01-14 NOTE — PROGRESS NOTES
Resting quietly in bed, respirations merry/easy. Patient admitted s/p a fall at home sustaining a right hip fracture. Surgical incision PK with prineo intact. Ambulating with a walker x1, SBA. Tolerating well. Lungs CTA, HR regular. Abdomen non tender with active bowel sounds. Has been continent of urine. C/o surgical site pain and only taking tylenol for the pain, declines need for stronger medication. Encouraged to call for all needs, call light remains in reach at all times. Bed alarm active. Denise Dodge RN

## 2025-01-15 PROCEDURE — 2500000003 HC RX 250 WO HCPCS: Performed by: PHYSICAL MEDICINE & REHABILITATION

## 2025-01-15 PROCEDURE — 94760 N-INVAS EAR/PLS OXIMETRY 1: CPT

## 2025-01-15 PROCEDURE — 97530 THERAPEUTIC ACTIVITIES: CPT | Performed by: PHYSICAL THERAPIST

## 2025-01-15 PROCEDURE — 97535 SELF CARE MNGMENT TRAINING: CPT

## 2025-01-15 PROCEDURE — 6370000000 HC RX 637 (ALT 250 FOR IP): Performed by: PHYSICAL MEDICINE & REHABILITATION

## 2025-01-15 PROCEDURE — 97110 THERAPEUTIC EXERCISES: CPT | Performed by: PHYSICAL THERAPIST

## 2025-01-15 PROCEDURE — 97110 THERAPEUTIC EXERCISES: CPT

## 2025-01-15 PROCEDURE — 1280000000 HC REHAB R&B

## 2025-01-15 PROCEDURE — 6360000002 HC RX W HCPCS: Performed by: PHYSICAL MEDICINE & REHABILITATION

## 2025-01-15 PROCEDURE — 6370000000 HC RX 637 (ALT 250 FOR IP): Performed by: STUDENT IN AN ORGANIZED HEALTH CARE EDUCATION/TRAINING PROGRAM

## 2025-01-15 PROCEDURE — 97116 GAIT TRAINING THERAPY: CPT | Performed by: PHYSICAL THERAPIST

## 2025-01-15 RX ORDER — LIDOCAINE 4 G/G
1 PATCH TOPICAL DAILY
Status: DISCONTINUED | OUTPATIENT
Start: 2025-01-15 | End: 2025-01-18 | Stop reason: HOSPADM

## 2025-01-15 RX ADMIN — BRIMONIDINE TARTRATE 1 DROP: 2 SOLUTION OPHTHALMIC at 07:30

## 2025-01-15 RX ADMIN — LOSARTAN POTASSIUM 25 MG: 25 TABLET, FILM COATED ORAL at 20:41

## 2025-01-15 RX ADMIN — ASPIRIN 81 MG: 81 TABLET, COATED ORAL at 20:41

## 2025-01-15 RX ADMIN — PROMETHAZINE HYDROCHLORIDE 12.5 MG: 25 TABLET ORAL at 06:09

## 2025-01-15 RX ADMIN — SENNOSIDES AND DOCUSATE SODIUM 1 TABLET: 50; 8.6 TABLET ORAL at 20:42

## 2025-01-15 RX ADMIN — ACETAMINOPHEN 650 MG: 325 TABLET ORAL at 12:18

## 2025-01-15 RX ADMIN — ACETAMINOPHEN 650 MG: 325 TABLET ORAL at 20:41

## 2025-01-15 RX ADMIN — ASPIRIN 81 MG: 81 TABLET, COATED ORAL at 07:29

## 2025-01-15 RX ADMIN — TIMOLOL MALEATE 1 DROP: 5 SOLUTION OPHTHALMIC at 07:30

## 2025-01-15 RX ADMIN — SODIUM CHLORIDE, PRESERVATIVE FREE 10 ML: 5 INJECTION INTRAVENOUS at 20:41

## 2025-01-15 RX ADMIN — ENOXAPARIN SODIUM 30 MG: 100 INJECTION SUBCUTANEOUS at 07:29

## 2025-01-15 RX ADMIN — TIMOLOL MALEATE 1 DROP: 5 SOLUTION OPHTHALMIC at 20:42

## 2025-01-15 RX ADMIN — Medication 2000 UNITS: at 07:29

## 2025-01-15 RX ADMIN — ACETAMINOPHEN 650 MG: 325 TABLET ORAL at 06:08

## 2025-01-15 RX ADMIN — ATORVASTATIN CALCIUM 20 MG: 20 TABLET, FILM COATED ORAL at 20:41

## 2025-01-15 RX ADMIN — SODIUM CHLORIDE, PRESERVATIVE FREE 10 ML: 5 INJECTION INTRAVENOUS at 07:31

## 2025-01-15 RX ADMIN — BRIMONIDINE TARTRATE 1 DROP: 2 SOLUTION OPHTHALMIC at 20:42

## 2025-01-15 ASSESSMENT — PAIN DESCRIPTION - ONSET
ONSET: ON-GOING

## 2025-01-15 ASSESSMENT — PAIN SCALES - GENERAL
PAINLEVEL_OUTOF10: 0
PAINLEVEL_OUTOF10: 4
PAINLEVEL_OUTOF10: 7
PAINLEVEL_OUTOF10: 5
PAINLEVEL_OUTOF10: 6
PAINLEVEL_OUTOF10: 7

## 2025-01-15 ASSESSMENT — PAIN DESCRIPTION - ORIENTATION
ORIENTATION: RIGHT

## 2025-01-15 ASSESSMENT — PAIN DESCRIPTION - LOCATION
LOCATION: HIP

## 2025-01-15 ASSESSMENT — PAIN - FUNCTIONAL ASSESSMENT: PAIN_FUNCTIONAL_ASSESSMENT: ACTIVITIES ARE NOT PREVENTED

## 2025-01-15 ASSESSMENT — PAIN DESCRIPTION - PAIN TYPE
TYPE: ACUTE PAIN

## 2025-01-15 ASSESSMENT — PAIN DESCRIPTION - FREQUENCY
FREQUENCY: INTERMITTENT

## 2025-01-15 ASSESSMENT — PAIN DESCRIPTION - DESCRIPTORS
DESCRIPTORS: ACHING;DISCOMFORT
DESCRIPTORS: DISCOMFORT
DESCRIPTORS: DISCOMFORT

## 2025-01-15 NOTE — PROGRESS NOTES
Patient admitted to rehab with closed fracture of right hip,(R Hip Hemiarthroplasty).  A/Ox4. Transfers with rolling walker and gait belt CGA. Mobility restrictions: WBAT, posterolateral approach hip precautions. On Regular diet, tolerating well. Medications taken whole with thin liquids. On Aspirin and Lovenox for DVT prophylaxis.  Skin: surgical incision right hip w/Prineo, skin tear L elbow Mepilex intact. Oxygen: RA. LDA: PIV - LFA . Has been continent of bowel and bladder. LBM 1/14/2025. Chair/bed alarms in use and call light in reach. Will monitor for safety. Electronically signed by Griselda Garcia RN on 1/15/2025 at 2:42 PM

## 2025-01-15 NOTE — PLAN OF CARE
Problem: Discharge Planning  Goal: Discharge to home or other facility with appropriate resources  1/15/2025 1427 by Griselda Garcia RN  Outcome: Progressing     Problem: Safety - Adult  Goal: Free from fall injury  1/15/2025 1427 by Griselda Garcia RN  Outcome: Progressing     Problem: Skin/Tissue Integrity  Goal: Absence of new skin breakdown  Description: 1.  Monitor for areas of redness and/or skin breakdown  2.  Assess vascular access sites hourly  3.  Every 4-6 hours minimum:  Change oxygen saturation probe site  4.  Every 4-6 hours:  If on nasal continuous positive airway pressure, respiratory therapy assess nares and determine need for appliance change or resting period.  1/15/2025 1427 by Griselda Garcia RN  Outcome: Progressing     Problem: ABCDS Injury Assessment  Goal: Absence of physical injury  1/15/2025 1427 by Griselda Garcia RN  Outcome: Progressing     Problem: Musculoskeletal - Adult  Goal: Return mobility to safest level of function  1/15/2025 1427 by Griselda Garcia RN  Outcome: Progressing     Problem: Musculoskeletal - Adult  Goal: Maintain proper alignment of affected body part  1/15/2025 1427 by Griselda Garcia RN  Outcome: Progressing     Problem: Musculoskeletal - Adult  Goal: Return ADL status to a safe level of function  1/15/2025 1427 by Griselda Garcia RN  Outcome: Progressing     Problem: Pain  Goal: Verbalizes/displays adequate comfort level or baseline comfort level  1/15/2025 1427 by Griselda Garcia RN  Outcome: Progressing

## 2025-01-15 NOTE — PROGRESS NOTES
Resting quietly in bed, respirations merry/easy. Reporting poor sleep overnight so far. Patient admitted s/p a fall at home sustaining a right hip fracture. Surgical incision PK with prineo intact. Ambulating with a walker x1, SBA. Tolerating well. Lungs CTA, HR regular. Abdomen non tender with active bowel sounds. Has been continent of urine. C/o surgical site pain and only taking tylenol for the pain, declines need for stronger medication. Ice applied for comfort. Encouraged to call for all needs, call light remains in reach at all times. Bed alarm active. Denise Dodge RN

## 2025-01-15 NOTE — PROGRESS NOTES
Physical Therapy  Facility/Department: [address] REHAB  Rehabilitation Physical Therapy Treatment Note    NAME: Merari Cummins  : 1940 (84 y.o.)  MRN: [id number]  CODE STATUS: Full Code    Date of Service: 1/15/25       Restrictions:  Restrictions/Precautions: Fall Risk, Weight Bearing, Aspiration Risk  Lower Extremity Weight Bearing Restrictions  Right Lower Extremity Weight Bearing: Weight Bearing As Tolerated  Position Activity Restriction  Hip Precautions: Posterior hip precautions  Other Position/Activity Restrictions: No adduction, flexion beyond 90 degrees of operative hip. Keep \"toes to nbail\" in bed. Follow these restrictions for 3 months postoperatively or until restrictions are released from surgeon.     SUBJECTIVE  Subjective: Pt reports she had a hard time sleeping because her neighbor was making a lot of noise.       OBJECTIVE  Cognition  Overall Cognitive Status: [facility] [place]  Cognition Comment: Pt able to state/describe all hip precautions w/ increased time  Orientation  Overall Orientation Status: Within Functional Limits  Orientation Level: Oriented X4    Functional Mobility  Balance  Standing Balance: Stand by assistance (RW)  Transfers  Surface: Wheelchair  Additional Factors: Hand placement cues;Increased time to complete  Device: Walker (RW)  Sit to Stand  Assistance Level: Stand by assist  Stand to Sit  Assistance Level: Stand by assist  Car Transfer  Assistance Level: Stand by assist  Skilled Clinical Factors: 2 cushions on chair to build up seat to simulate minivan, cues for hand placement      Environmental Mobility  Ambulation  Surface: Level surface;Carpet  Device: Rolling walker  Distance: 200' x2  Activity: Within Unit  Additional Factors: Increased time to complete  Assistance Level: Stand by assist  Gait Deviations: Slow bjorn;Decreased step length left;Decreased weight shift right  Skilled Clinical Factors: Step through gait good foot placement on turns  Stairs  Stair Height: 6''  Device:  Cane;One handrail (L)  Number of Stairs: 12  Additional Factors: Non-reciprocal going up;Non-reciprocal going down;Increased time to complete  Assistance Level: Stand by assist  Skilled Clinical Factors: Cues for sequence with cane and LHR  Skilled Clinical Factors - Comments: The pt enters the front of the home where there are 2 + 3 MISTI with LHR. Reviewed this on line with picture of her house and discussed using her SPC in the R hand. A helper would put the RW up on the step, she would go up 2 steps to get it, use it to get to the next set of steps then have the helper put it on the top step again and use the cane to go up the next 3 steps. Then transition back to the RW to get inside.  Curb  Curb Height: 6''  Device: Rolling walker  Number of Curbs: 1  Additional Factors: Increased time to complete;Verbal cues  Assistance Level: Stand by assist  Skilled Clinical Factors: Cues for sequence, tries to hold onto HR rather than B hands on RW.    PT Exercises  Dynamic Standing Balance Exercises: Standing ball tap x5 min w/o LOB, encouraged to use BUEs.      PM session    Environmental Mobility  Ambulation  Surface: Level surface;Carpet  Device: Rolling walker  Distance: 20', 150'  Activity: Within Unit  Additional Factors: Increased time to complete  Assistance Level: Stand by assist  Gait Deviations: Slow bjorn;Decreased step length left;Decreased weight shift right  Skilled Clinical Factors: Step through gait good foot placement on turns      PT Exercises  A/AROM Exercises: Supine APs,quad sets, glut sets, HS sets, ADD sets, SAQ, heel slides, ABD/ADD, SLR, sitting marches, LAQ x20 reps each LE.  For HEP:Visit [email or web address] Access Code: [email or web address]  Dynamic Standing Balance Exercises: Standing ball tap x5 min w/o LOB, encouraged to use BUEs.  Postural Correction Exercises: Ball 5 way x10 reps, cues for posture      ASSESSMENT/PROGRESS TOWARDS GOALS       Assessment  Assessment: Patient is an 83 yo female

## 2025-01-15 NOTE — PLAN OF CARE
Problem: Safety - Adult  Goal: Free from fall injury  Outcome: Progressing  Flowsheets (Taken 1/15/2025 0401)  Free From Fall Injury:   Instruct family/caregiver on patient safety   Based on caregiver fall risk screen, instruct family/caregiver to ask for assistance with transferring infant if caregiver noted to have fall risk factors  Note: Pt educated on falls precautions and safety. Call light and personal belongings within reach at all times. Non skid socks in use when up. Hourly rounding and alarms active.      Problem: Skin/Tissue Integrity  Goal: Absence of new skin breakdown  Description: 1.  Monitor for areas of redness and/or skin breakdown  2.  Assess vascular access sites hourly  3.  Every 4-6 hours minimum:  Change oxygen saturation probe site  4.  Every 4-6 hours:  If on nasal continuous positive airway pressure, respiratory therapy assess nares and determine need for appliance change or resting period.  Outcome: Progressing  Note: Able to change positions in bed without assist, no evidence of skin breakdown noted. Waffle cushion in place to chair.      Problem: Pain  Goal: Verbalizes/displays adequate comfort level or baseline comfort level  Outcome: Progressing  Flowsheets (Taken 1/15/2025 0401)  Verbalizes/displays adequate comfort level or baseline comfort level:   Assess pain using appropriate pain scale   Encourage patient to monitor pain and request assistance  Note: Able to rate pain using a 1-10 scale, medicated per prn orders, see MAR. Able to verbalize a reduction in pain and/or able to fall asleep and remain asleep without any s/s of pain

## 2025-01-15 NOTE — PROGRESS NOTES
Occupational Therapy  Facility/Department: [address] REHAB  Rehabilitation Occupational Therapy Daily Treatment Note  AM and PM Sessions:    Date: 1/15/25  Patient Name: Merari Cummins       Room: W7M-8255/3256-01  MRN: [id number]  Account: [id number]   : 1940  (84 y.o.) Gender: female                    Past Medical History:  has a past medical history of Cataracts, bilateral, HTN (hypertension), and Vasculitis (HCC).  Past Surgical History:   has a past surgical history that includes hip surgery (Right, 2025).    Restrictions  Restrictions/Precautions: Fall Risk, Weight Bearing, Aspiration Risk  Hip Precautions: Posterior hip precautions  Other Position/Activity Restrictions: No adduction, flexion beyond 90 degrees of operative hip. Keep \"toes to nabil\" in bed. Follow these restrictions for 3 months postoperatively or until restrictions are released from surgeon.  Right Lower Extremity Weight Bearing: Weight Bearing As Tolerated  Equipment Used:  (RW)    Subjective  Subjective: Pt met in dept seated in w/c agreeable to tx. Rated her R hip pain 10.  Restrictions/Precautions: Fall Risk;Weight Bearing;Aspiration Risk             Objective     Cognition  Overall Cognitive Status: [facility]  Cognition Comment: Pt able to state/describe all hip precautions w/ increased time  Orientation  Overall Orientation Status: Within Functional Limits  Orientation Level: Oriented X4         ADL       Instrumental ADL's  Instrumental ADLs: Yes  Meal Prep  Meal Prep Level: Walker (RW)  Meal Prep Level of Assistance: Stand by assistance  Meal Preparation: Pt re-educated on walker safety/application of precautions prior to food prep task. Pt amb w/ RW in OT kitchen to gather items to make breakfast uisng RW & tray for carrying. Pt was able to retrieve egg/bread/jelly in refrigerator, items in upper/lower cupboards/drawer to fuller egg on stove, make toast in toaster and instacne coffee in micro all SBA/supervision standing for 18  horn min cues. Donned socks with sock aid set-up, shoes with SBA w/ reacher/sock aid min cues/effort.   Pt removed bed sheet from RW & carried over walker to deposit in hamper SBA, then pt retrieved clean sheet, spread it onto bed to make up bed & then placed pillows. Pt standing for ~ 5 minutes for task, walking around bed 2x SBA.  Pt performed UBE arm bike x 5 minutes to increase activity tolerance.     Pt progressing well with ADL/IADL function and mobility and is on target for d/c Sat as planned. Cont tx per POC.     Safety Device - Type of devices: Left w/ PT for next session  []  All fall risk precautions in place [] Bed alarm in place  [] Call light within reach [] Chair alarm in place [] Positioning belt [x] Gait belt [x] Patient at risk for falls [] Left in bed [x] Left in chair [] Telesitter in use [] Sitter present [] Nurse notified []  None    Assessment  Assessment  Assessment: Today pt progressed w/ IADL function using RW w/ tray for carrying to make breakfast & serve to table, then cleaned up w/ education in applicatoin of walker safety & posterior hip precautoins. Pt verbalized /demo understanding w/ only occasional cues. Pt was ed in/performed SIRI green T-band HEP & demo understanding. Pt cont to be below her baseline of IND but is on target for D/C Saturday 1/18/25 as planned, w/ home OT. Plan to cont t per POC.  Activity Tolerance: Patient tolerated evaluation without incident  Discharge Recommendations: Home with Home health OT;Home with assist PRN  Factors Affecting Discharge: Pt lives alone but does have good family support  OT Equipment Recommendations  Other: Ordered TTB,  hip kit; needs walker basket/tray, Gopher reacher(OT gave hand outs on tray & reacher)declined TSF  Safety Devices  Safety Devices in place: Yes  Type of devices: Patient at risk for falls;Left in chair;Gait belt    Patient Education  Education  Education Given To: Patient  Education Provided: Safety;ADL

## 2025-01-15 NOTE — PROGRESS NOTES
Department of Physical Medicine & Rehabilitation  Progress Note    Patient Identification:  Merari Cummins  [id number]  : 1940  Admit date: 1/10/2025    Chief Complaint: Closed displaced intertrochanteric fracture of right femur, initial encounter ([facility])    Subjective:   No acute events overnight.   Patient seen this am sitting up in room. She denies constipation. Had poor sleep last night due to noise/disturbance from another patient. Otherwise feeling well. We discussed short and long term functional goals and eventual return to work. I completed [place] paperwork.    Labs reviewed.     ROS: No f/c, n/v, cp     Objective:  Patient Vitals for the past 24 hrs:   BP Temp Temp src Pulse Resp SpO2 Weight   01/15/25 0808 -- -- -- -- -- 96 % --   01/15/25 0610 -- -- -- -- -- -- 53 kg (116 lb 13.5 oz)   25 134/79 98.1 °F (36.7 °C) Oral 81 18 97 % --   25 1108 -- -- -- -- -- 96 % --     Const: Alert. No distress, pleasant.   HEENT: Normocephalic, atraumatic. Normal sclera/conjunctiva. MMM.   CV: Regular rate and rhythm.   Resp: No respiratory distress. Lungs CTAB.   Abd: Soft, nontender, nondistended, NABS+   Ext: Mild RLE edema  MSK: decreased right hip ROM  Neuro: Alert, oriented, appropriately interactive.   Psych: Cooperative, appropriate mood and affect    Laboratory data: Available via EMR.   Last 24 hour lab  No results found for this or any previous visit (from the past 24 hour(s)).      Therapy progress:  Physical therapy:  Bed Mobility:  Overall Assistance Level: Stand By Assist  Additional Factors: Increased time to complete  Sit>supine:  Assistance Level: Stand by assist  Supine>sit:  Assistance Level: Stand by assist  Transfers:  Surface: Wheelchair  Additional Factors: Hand placement cues, Increased time to complete  Device: Walker (RW)  Sit>stand:  Assistance Level: Stand by assist  Stand>sit:  Assistance Level: Stand by assist  Bed<>chair     Stand Pivot:     Lateral transfer:     Car  transfer:  Assistance Level: Stand by assist  Skilled Clinical Factors: 2 cushions on chair to build up seat to simulate minivan, cues for hand placement  Ambulation:  Surface: Level surface, Carpet  Device: Rolling walker  Distance: 200' x2  Activity: Within Unit  Additional Factors: Increased time to complete  Assistance Level: Stand by assist  Gait Deviations: Slow bjorn, Decreased step length left, Decreased weight shift right  Skilled Clinical Factors: Step through gait good foot placement on turns  Stairs:  Stair Height: 6''  Device: Cane, One handrail (L)  Number of Stairs: 12  Additional Factors: Non-reciprocal going up, Non-reciprocal going down, Increased time to complete  Assistance Level: Stand by assist  Skilled Clinical Factors: Cues for sequence with cane and LHR  Skilled Clinical Factors - Comments: Pt enters the front of the home where there are 2 + 3 MISTI with LHR. Reviewed this on line with picture of her house and discussed using her SPC in the R hand. A helper would put the RW up on the step, she would go up 2 steps to get it, use it to get to the next set of steps then use the cane to go up the next 3 with the RW placed on the top step, then transition back to the RW to get in.  Curb:  Curb Height: 6''  Device: Rolling walker  Number of Curbs: 1  Additional Factors: Increased time to complete, Verbal cues  Assistance Level: Stand by assist  Skilled Clinical Factors: Cues for sequence, tries to hold onto HR rather than B hands on RW.  Wheelchair:       Occupational therapy:   Feeding     Grooming/Oral Hygiene  Assistance Level: Modified independent  Skilled Clinical Factors: seated at sink in w/c IND to brush teeth/hair  UE Bathing  Equipment Provided: Long-handled sponge  Assistance Level: Set-up  Skilled Clinical Factors: OT covered IV & was kept dry, bathed on shower chair, OT re-ed on hand hled shower & controls; pt completed w/ set-up including hair/back  LE Bathing  Equipment Provided:

## 2025-01-16 LAB
ANION GAP SERPL CALCULATED.3IONS-SCNC: 9 MMOL/L (ref 3–16)
BASOPHILS # BLD: 0 K/UL (ref 0–0.2)
BASOPHILS NFR BLD: 0 %
BUN SERPL-MCNC: 15 MG/DL (ref 7–20)
CALCIUM SERPL-MCNC: 9.4 MG/DL (ref 8.3–10.6)
CHLORIDE SERPL-SCNC: 101 MMOL/L (ref 99–110)
CO2 SERPL-SCNC: 28 MMOL/L (ref 21–32)
CREAT SERPL-MCNC: 0.6 MG/DL (ref 0.6–1.2)
DEPRECATED RDW RBC AUTO: 13.5 % (ref 12.4–15.4)
EOSINOPHIL # BLD: 0.4 K/UL (ref 0–0.6)
EOSINOPHIL NFR BLD: 4 %
GFR SERPLBLD CREATININE-BSD FMLA CKD-EPI: 89 ML/MIN/{1.73_M2}
GLUCOSE SERPL-MCNC: 102 MG/DL (ref 70–99)
HCT VFR BLD AUTO: 37.4 % (ref 36–48)
HGB BLD-MCNC: 12.6 G/DL (ref 12–16)
LYMPHOCYTES # BLD: 1.8 K/UL (ref 1–5.1)
LYMPHOCYTES NFR BLD: 18 %
MCH RBC QN AUTO: 31.6 PG (ref 26–34)
MCHC RBC AUTO-ENTMCNC: 33.7 G/DL (ref 31–36)
MCV RBC AUTO: 93.9 FL (ref 80–100)
MONOCYTES # BLD: 1.5 K/UL (ref 0–1.3)
MONOCYTES NFR BLD: 15 %
NEUTROPHILS # BLD: 6.4 K/UL (ref 1.7–7.7)
NEUTROPHILS NFR BLD: 63 %
PATH INTERP BLD-IMP: NORMAL
PATH INTERP BLD-IMP: YES
PLATELET # BLD AUTO: 320 K/UL (ref 135–450)
PLATELET BLD QL SMEAR: ADEQUATE
PMV BLD AUTO: 9.5 FL (ref 5–10.5)
POTASSIUM SERPL-SCNC: 4.7 MMOL/L (ref 3.5–5.1)
RBC # BLD AUTO: 3.99 M/UL (ref 4–5.2)
RBC MORPH BLD: NORMAL
SLIDE REVIEW: ABNORMAL
SODIUM SERPL-SCNC: 138 MMOL/L (ref 136–145)
WBC # BLD AUTO: 10.2 K/UL (ref 4–11)

## 2025-01-16 PROCEDURE — 6360000002 HC RX W HCPCS: Performed by: PHYSICAL MEDICINE & REHABILITATION

## 2025-01-16 PROCEDURE — 1280000000 HC REHAB R&B

## 2025-01-16 PROCEDURE — 6370000000 HC RX 637 (ALT 250 FOR IP): Performed by: PHYSICAL MEDICINE & REHABILITATION

## 2025-01-16 PROCEDURE — 94760 N-INVAS EAR/PLS OXIMETRY 1: CPT

## 2025-01-16 PROCEDURE — 97530 THERAPEUTIC ACTIVITIES: CPT | Performed by: PHYSICAL THERAPIST

## 2025-01-16 PROCEDURE — 6370000000 HC RX 637 (ALT 250 FOR IP): Performed by: STUDENT IN AN ORGANIZED HEALTH CARE EDUCATION/TRAINING PROGRAM

## 2025-01-16 PROCEDURE — 85025 COMPLETE CBC W/AUTO DIFF WBC: CPT

## 2025-01-16 PROCEDURE — 97116 GAIT TRAINING THERAPY: CPT | Performed by: PHYSICAL THERAPIST

## 2025-01-16 PROCEDURE — 2500000003 HC RX 250 WO HCPCS: Performed by: PHYSICAL MEDICINE & REHABILITATION

## 2025-01-16 PROCEDURE — 97110 THERAPEUTIC EXERCISES: CPT | Performed by: PHYSICAL THERAPIST

## 2025-01-16 PROCEDURE — 80048 BASIC METABOLIC PNL TOTAL CA: CPT

## 2025-01-16 PROCEDURE — 97535 SELF CARE MNGMENT TRAINING: CPT

## 2025-01-16 RX ADMIN — SENNOSIDES AND DOCUSATE SODIUM 1 TABLET: 50; 8.6 TABLET ORAL at 07:22

## 2025-01-16 RX ADMIN — ASPIRIN 81 MG: 81 TABLET, COATED ORAL at 07:12

## 2025-01-16 RX ADMIN — SODIUM CHLORIDE, PRESERVATIVE FREE 10 ML: 5 INJECTION INTRAVENOUS at 07:16

## 2025-01-16 RX ADMIN — ACETAMINOPHEN 650 MG: 325 TABLET ORAL at 07:12

## 2025-01-16 RX ADMIN — TIMOLOL MALEATE 1 DROP: 5 SOLUTION OPHTHALMIC at 20:16

## 2025-01-16 RX ADMIN — ATORVASTATIN CALCIUM 20 MG: 20 TABLET, FILM COATED ORAL at 20:14

## 2025-01-16 RX ADMIN — TIMOLOL MALEATE 1 DROP: 5 SOLUTION OPHTHALMIC at 07:13

## 2025-01-16 RX ADMIN — BRIMONIDINE TARTRATE 1 DROP: 2 SOLUTION OPHTHALMIC at 20:16

## 2025-01-16 RX ADMIN — ENOXAPARIN SODIUM 30 MG: 100 INJECTION SUBCUTANEOUS at 07:12

## 2025-01-16 RX ADMIN — ASPIRIN 81 MG: 81 TABLET, COATED ORAL at 20:14

## 2025-01-16 RX ADMIN — LOSARTAN POTASSIUM 25 MG: 25 TABLET, FILM COATED ORAL at 20:14

## 2025-01-16 RX ADMIN — Medication 2000 UNITS: at 07:13

## 2025-01-16 RX ADMIN — BRIMONIDINE TARTRATE 1 DROP: 2 SOLUTION OPHTHALMIC at 07:14

## 2025-01-16 RX ADMIN — ACETAMINOPHEN 650 MG: 325 TABLET ORAL at 20:14

## 2025-01-16 ASSESSMENT — PAIN DESCRIPTION - ORIENTATION: ORIENTATION: RIGHT

## 2025-01-16 ASSESSMENT — PAIN SCALES - GENERAL
PAINLEVEL_OUTOF10: 4
PAINLEVEL_OUTOF10: 6
PAINLEVEL_OUTOF10: 6
PAINLEVEL_OUTOF10: 4
PAINLEVEL_OUTOF10: 4

## 2025-01-16 ASSESSMENT — PAIN DESCRIPTION - LOCATION
LOCATION: HIP
LOCATION: HIP

## 2025-01-16 ASSESSMENT — PAIN DESCRIPTION - DESCRIPTORS: DESCRIPTORS: BURNING

## 2025-01-16 ASSESSMENT — PAIN SCALES - WONG BAKER: WONGBAKER_NUMERICALRESPONSE: NO HURT

## 2025-01-16 ASSESSMENT — PAIN - FUNCTIONAL ASSESSMENT: PAIN_FUNCTIONAL_ASSESSMENT: ACTIVITIES ARE NOT PREVENTED

## 2025-01-16 NOTE — PROGRESS NOTES
Physical Therapy  Facility/Department: [address] REHAB  Rehabilitation Physical Therapy Treatment Note    NAME: Merari Cummins  : 1940 (84 y.o.)  MRN: [id number]  CODE STATUS: Full Code    Date of Service: 25       Restrictions:  Restrictions/Precautions: Fall Risk, Weight Bearing, Aspiration Risk  Lower Extremity Weight Bearing Restrictions  Right Lower Extremity Weight Bearing: Weight Bearing As Tolerated  Position Activity Restriction  Hip Precautions: Posterior hip precautions  Other Position/Activity Restrictions: No adduction, flexion beyond 90 degrees of operative hip. Keep \"toes to nabil\" in bed. Follow these restrictions for 3 months postoperatively or until restrictions are released from surgeon.     SUBJECTIVE  Subjective: Pt reports slept much better last night.       OBJECTIVE  Cognition  Overall Cognitive Status: [facility]  Cognition Comment: Pt able to state/describe all hip precautions.  Orientation  Overall Orientation Status: Within Functional Limits  Orientation Level: Oriented X4    Functional Mobility  Bed Mobility  Overall Assistance Level: Supervision  Additional Factors: Increased time to complete;Head of bed flat;Without handrails  Sit to Supine  Assistance Level: Supervision  Supine to Sit  Assistance Level: Supervision  Scooting  Assistance Level: Supervision  Balance  Sitting Balance: Modified independent   Standing Balance: Supervision (RW)  Transfers  Surface: Wheelchair;To mat;From mat  Additional Factors: Increased time to complete  Device: Walker (RW)  Sit to Stand  Assistance Level: Supervision  Stand to Sit  Assistance Level: Supervision  Bed To/From Chair  Assistance Level: Supervision      Environmental Mobility  Ambulation  Surface: Level surface;Carpet  Device: Rolling walker  Distance: 500'  Activity: Within Unit  Additional Factors: Increased time to complete  Assistance Level: Supervision  Gait Deviations: Slow bjorn;Decreased step length left;Decreased weight shift  Program;Equipment  Education Provided Comments: Posterior/lateral hip precautions-pt was able to name all 3  Education Method: Demonstration;Verbal  Barriers to Learning: None  Education Outcome: Verbalized understanding;Continued education needed;Demonstrated understanding        Therapy Time   Individual Concurrent Group Co-treatment   Time In 0900         Time Out 0945         Minutes 45           Timed Code Treatment Minutes: 45 Minutes       KAREN CARRANZA PT, 01/16/25 at 1:46 PM     Electronically signed by KAREN CARRANZA PT on 1/16/25 at 2:02 PM EST

## 2025-01-16 NOTE — PROGRESS NOTES
Patient admitted to rehab with closed displaced intertrochanteric fx R femur.  A/Ox4. Transfers with walker x1. Mobility restrictions: posterolateral approach hip precautions-no adduction no hip flexion greater than 90 degrees. On regular diet, tolerating well. Medications taken whole with thins. On lovenox for DVT prophylaxis.  Skin: scattered bruising/abrasions, incision to R hip with prineo in place. Oxygen: RA. LDA: PIV LFA . Has been continent of bowel and continent of bladder. LBM 1/16. Chair/bed alarms in use and call light in reach. Will monitor for safety.

## 2025-01-16 NOTE — PROGRESS NOTES
Resting quietly in bed, respirations merry/easy. Sleeping well overnight. Patient admitted s/p a fall at home sustaining a right hip fracture. Surgical incision PK with prineo intact. Ambulating with a walker x1, SBA. Tolerating well. Lungs clear but diminished. HR regular. Abdomen non tender with active bowel sounds. Has been continent of urine. C/o surgical site pain and only taking tylenol for the pain and reports that is effective. Encouraged to call for all needs, call light remains in reach at all times. Bed alarm active. Denise Dodge RN

## 2025-01-16 NOTE — PROGRESS NOTES
Occupational Therapy  Facility/Department: [address] REHAB  Rehabilitation Occupational Therapy Daily Treatment Note  AM and PM Sessions:    Date: 25  Patient Name: Merari Cummins       Room: H2I-4026/3256-01  MRN: [id number]  Account: [id number]   : 1940  (84 y.o.) Gender: female                    Past Medical History:  has a past medical history of Cataracts, bilateral, HTN (hypertension), and Vasculitis (HCC).  Past Surgical History:   has a past surgical history that includes hip surgery (Right, 2025).    Restrictions  Restrictions/Precautions: Fall Risk, Weight Bearing, Aspiration Risk  Hip Precautions: Posterior hip precautions  Other Position/Activity Restrictions: No adduction, flexion beyond 90 degrees of operative hip. Keep \"toes to nabil\" in bed. Follow these restrictions for 3 months postoperatively or until restrictions are released from surgeon.  Right Lower Extremity Weight Bearing: Weight Bearing As Tolerated  Equipment Used:  (RW)    Subjective  Subjective: Pt met in room in bed, agreeable to ADL session for sponge bath at sink, as plan for shower tomorrow. Rated her R hip pain 6/10, nurse Jason in room, she  stated had meds already.  Restrictions/Precautions: Fall Risk;Weight Bearing;Aspiration Risk             Objective     Cognition  Overall Cognitive Status: [facility]  Cognition Comment: Pt able to state/describe all hip precautions w/ increased time  Orientation  Overall Orientation Status: Within Functional Limits  Orientation Level: Oriented X4         ADL  Feeding  Assistance Level: Modified independent  Skilled Clinical Factors: wears dentures, eating IND  Grooming/Oral Hygiene  Assistance Level: Modified independent  Skilled Clinical Factors: stood at sink  IND to brush teeth/hair  Upper Extremity Bathing  Skilled Clinical Factors: seated at sink IND for sponge bath  Lower Extremity Bathing  Equipment Provided: Long-handled sponge  Assistance Level: Supervision;Modified

## 2025-01-16 NOTE — PLAN OF CARE
Problem: Safety - Adult  Goal: Free from fall injury  Outcome: Progressing  Flowsheets (Taken 1/16/2025 0431)  Free From Fall Injury:   Instruct family/caregiver on patient safety   Based on caregiver fall risk screen, instruct family/caregiver to ask for assistance with transferring infant if caregiver noted to have fall risk factors  Note: Pt educated on falls precautions and safety. Call light and personal belongings within reach at all times. Non skid socks in use when up. Hourly rounding and alarms active.      Problem: Skin/Tissue Integrity  Goal: Absence of new skin breakdown  Description: 1.  Monitor for areas of redness and/or skin breakdown  2.  Assess vascular access sites hourly  3.  Every 4-6 hours minimum:  Change oxygen saturation probe site  4.  Every 4-6 hours:  If on nasal continuous positive airway pressure, respiratory therapy assess nares and determine need for appliance change or resting period.  Outcome: Progressing  Note: Able to change positions in bed without assist, no evidence of skin breakdown noted. Waffle cushion in place to chair.      Problem: Pain  Goal: Verbalizes/displays adequate comfort level or baseline comfort level  Outcome: Progressing  Flowsheets (Taken 1/16/2025 0431)  Verbalizes/displays adequate comfort level or baseline comfort level:   Encourage patient to monitor pain and request assistance   Administer analgesics based on type and severity of pain and evaluate response   Consider cultural and social influences on pain and pain management  Note: Able to rate pain using a 1-10 scale, medicated per prn orders, see MAR. Able to verbalize a reduction in pain and/or able to fall asleep and remain asleep without any s/s of pain

## 2025-01-16 NOTE — PLAN OF CARE
Problem: Discharge Planning  Goal: Discharge to home or other facility with appropriate resources  1/16/2025 0431 by Denise Dodge RN  Outcome: Progressing     Problem: Safety - Adult  Goal: Free from fall injury  1/16/2025 0921 by Claudio Reed RN  Outcome: Progressing  Flowsheets (Taken 1/16/2025 0921)  Free From Fall Injury: Instruct family/caregiver on patient safety  1/16/2025 0431 by Denise Dodge RN  Outcome: Progressing  Flowsheets (Taken 1/16/2025 0431)  Free From Fall Injury:   Instruct family/caregiver on patient safety   Based on caregiver fall risk screen, instruct family/caregiver to ask for assistance with transferring infant if caregiver noted to have fall risk factors  Note: Pt educated on falls precautions and safety. Call light and personal belongings within reach at all times. Non skid socks in use when up. Hourly rounding and alarms active.      Problem: Skin/Tissue Integrity  Goal: Absence of new skin breakdown  Description: 1.  Monitor for areas of redness and/or skin breakdown  2.  Assess vascular access sites hourly  3.  Every 4-6 hours minimum:  Change oxygen saturation probe site  4.  Every 4-6 hours:  If on nasal continuous positive airway pressure, respiratory therapy assess nares and determine need for appliance change or resting period.  1/16/2025 0921 by Claudio Reed RN  Outcome: Progressing  1/16/2025 0431 by Denise Dodge RN  Outcome: Progressing  Note: Able to change positions in bed without assist, no evidence of skin breakdown noted. Waffle cushion in place to chair.      Problem: ABCDS Injury Assessment  Goal: Absence of physical injury  1/16/2025 0921 by Claudio Reed RN  Outcome: Progressing  Flowsheets (Taken 1/16/2025 0921)  Absence of Physical Injury: Implement safety measures based on patient assessment  1/16/2025 0431 by Denise Dodge RN  Outcome: Progressing     Problem: Musculoskeletal - Adult  Goal: Return mobility to safest level of  function  1/16/2025 0921 by Claudio Reed RN  Outcome: Progressing  Flowsheets (Taken 1/16/2025 0921)  Return Mobility to Safest Level of Function:   Assist with transfers and ambulation using safe patient handling equipment as needed   Assess patient stability and activity tolerance for standing, transferring and ambulating with or without assistive devices   Ensure adequate protection for wounds/incisions during mobilization   Instruct patient/family in ordered activity level  1/16/2025 0431 by Denise Dodge RN  Outcome: Progressing  Goal: Maintain proper alignment of affected body part  1/16/2025 0921 by Claudio Reed RN  Outcome: Progressing  Flowsheets (Taken 1/16/2025 0921)  Maintain proper alignment of affected body part:   Support and protect limb and body alignment per provider's orders   Instruct and reinforce with patient and family use of appropriate assistive device and precautions (e.g. spinal or hip dislocation precautions)  1/16/2025 0431 by Denise Dodge RN  Outcome: Progressing  Goal: Return ADL status to a safe level of function  1/16/2025 0921 by Claudio Reed RN  Outcome: Progressing  Flowsheets (Taken 1/16/2025 0921)  Return ADL Status to a Safe Level of Function:   Administer medication as ordered   Assess activities of daily living deficits and provide assistive devices as needed   Assist and instruct patient to increase activity and self care as tolerated  1/16/2025 0431 by Denise Dodge RN  Outcome: Progressing     Problem: Pain  Goal: Verbalizes/displays adequate comfort level or baseline comfort level  1/16/2025 0921 by Claudio Reed RN  Outcome: Progressing  Flowsheets (Taken 1/16/2025 0921)  Verbalizes/displays adequate comfort level or baseline comfort level:   Encourage patient to monitor pain and request assistance   Administer analgesics based on type and severity of pain and evaluate response   Assess pain using appropriate pain scale   Implement

## 2025-01-16 NOTE — PROGRESS NOTES
Department of Physical Medicine & Rehabilitation  Progress Note    Patient Identification:  Merari Cummins  [id number]  : 1940  Admit date: 1/10/2025    Chief Complaint: Closed displaced intertrochanteric fracture of right femur, initial encounter ([facility])    Subjective:   No acute events overnight.   Patient seen this am sitting up in gym. Reports ongoing progress. Slept better last night. Asks appropriate questions about dc planning. Education provided.   Labs reviewed.     ROS: No f/c, n/v, cp     Objective:  Patient Vitals for the past 24 hrs:   BP Temp Temp src Pulse Resp SpO2 Weight   25 0831 -- -- -- -- -- 96 % --   25 0711 (!) 151/88 98.2 °F (36.8 °C) Oral 72 18 96 % --   25 0657 -- -- -- -- -- -- 53.5 kg (117 lb 15.1 oz)   01/15/25 1957 (!) 155/80 98.4 °F (36.9 °C) Oral 87 17 96 % --     Const: Alert. No distress, pleasant.   HEENT: Normocephalic, atraumatic. Normal sclera/conjunctiva. MMM.   CV: Regular rate and rhythm.   Resp: No respiratory distress. Lungs CTAB.   Abd: Soft, nontender, nondistended, NABS+   Ext: Mild RLE edema  MSK: decreased right hip ROM  Neuro: Alert, oriented, appropriately interactive.   Psych: Cooperative, appropriate mood and affect    Laboratory data: Available via EMR.   Last 24 hour lab  Recent Results (from the past 24 hour(s))   CBC with Auto Differential    Collection Time: 25  5:14 AM   Result Value Ref Range    WBC 10.2 4.0 - 11.0 K/uL    RBC 3.99 (L) 4.00 - 5.20 M/uL    Hemoglobin 12.6 12.0 - 16.0 g/dL    Hematocrit 37.4 36.0 - 48.0 %    MCV 93.9 80.0 - 100.0 fL    MCH 31.6 26.0 - 34.0 pg    MCHC 33.7 31.0 - 36.0 g/dL    RDW 13.5 12.4 - 15.4 %    Platelets 320 135 - 450 K/uL    MPV 9.5 5.0 - 10.5 fL    PLATELET SLIDE REVIEW Adequate     SLIDE REVIEW see below     Path Consult Yes     Neutrophils % 63.0 %    Lymphocytes % 18.0 %    Monocytes % 15.0 %    Eosinophils % 4.0 %    Basophils % 0.0 %    Neutrophils Absolute 6.4 1.7 - 7.7 K/uL     Footwear  Equipment Provided: Sock aid, Long-handle shoe horn, Reachers  Assistance Level: Stand by assist, Verbal cues, Increased time to complete  Skilled Clinical Factors: Donned /doffed own slippers w/ reacher 1st time, donned own socks w/ sock aid, shoes w/ reacher/lg shoe horn & clothespin to hold tongue of R shoe back & she was able to remove clothespin, min cues/increased time  Toileting  Assistance Level: Modified independent  Skilled Clinical Factors: BM/voided,(nurse made aware) managed hygiene gown modified IND at RW/grab bar    Speech therapy:    ADULT DIET; Regular        Body mass index is 23.03 kg/m².    Rehabilitation Diagnosis:   Orthopedic, 8.11, Unilateral Hip Fracture        Assessment and Plan:     Impairments: decreased right hip ROM, balance      Right intertrochanteric femur fracture   -s/p hemiarthroplasty (1/9 with Dr. Jiang).   -Wound care per Ortho  -WBAT RLE with posterolateral hip precautions.   -PT/OT     Leukocytosis -- resolved  -Likely reactive  -Monitor temp and WBC trend  -Monitor for localizing signs/symptoms of infection     Anemia  -Post-surgical +/- dilutional  -Monitor Hgb, transfuse prn <7.      Microscopic polyangiitis/MPO-ANCA vasculitis   -Renal and pulmonary involvement.   -Previously treated with Rituxan.   -Monitor renal function.     HTN, labile  -continue losartan (dose decreased) with hold parameters  ---monitor trend, improving     HLD  -continue atorvastatin     Bladder   -High risk retention   -Monitor PVRs, SC prn >400cc     Bowel   -High risk constipation   -senna+colace BID, PRN miralax, MoM, and bisacodyl supp.     Safety   -fall and aspiration precautions     Pain control  -patient prefers acetaminophen for pain control and does not like oxycodone   -will trial tramadol if acetaminophen insufficient     DVT ppx  -continue ASA 81 BID x 30 days post op per Ortho    Rehab Progress: Making progress. Overall supervision formobility, Mia-supervision for ADLs.

## 2025-01-17 PROCEDURE — 94760 N-INVAS EAR/PLS OXIMETRY 1: CPT

## 2025-01-17 PROCEDURE — 6360000002 HC RX W HCPCS: Performed by: PHYSICAL MEDICINE & REHABILITATION

## 2025-01-17 PROCEDURE — 1280000000 HC REHAB R&B

## 2025-01-17 PROCEDURE — 97110 THERAPEUTIC EXERCISES: CPT

## 2025-01-17 PROCEDURE — 97535 SELF CARE MNGMENT TRAINING: CPT

## 2025-01-17 PROCEDURE — 97110 THERAPEUTIC EXERCISES: CPT | Performed by: PHYSICAL THERAPIST

## 2025-01-17 PROCEDURE — 6370000000 HC RX 637 (ALT 250 FOR IP): Performed by: PHYSICAL MEDICINE & REHABILITATION

## 2025-01-17 PROCEDURE — 97116 GAIT TRAINING THERAPY: CPT | Performed by: PHYSICAL THERAPIST

## 2025-01-17 PROCEDURE — 97530 THERAPEUTIC ACTIVITIES: CPT | Performed by: PHYSICAL THERAPIST

## 2025-01-17 PROCEDURE — 97530 THERAPEUTIC ACTIVITIES: CPT

## 2025-01-17 PROCEDURE — 6370000000 HC RX 637 (ALT 250 FOR IP): Performed by: STUDENT IN AN ORGANIZED HEALTH CARE EDUCATION/TRAINING PROGRAM

## 2025-01-17 RX ORDER — SENNA AND DOCUSATE SODIUM 50; 8.6 MG/1; MG/1
1 TABLET, FILM COATED ORAL DAILY PRN
COMMUNITY
Start: 2025-01-17

## 2025-01-17 RX ORDER — ASPIRIN 81 MG/1
81 TABLET ORAL 2 TIMES DAILY
Qty: 44 TABLET | Refills: 0 | Status: SHIPPED | OUTPATIENT
Start: 2025-01-17 | End: 2025-02-08

## 2025-01-17 RX ADMIN — ACETAMINOPHEN 650 MG: 325 TABLET ORAL at 10:21

## 2025-01-17 RX ADMIN — Medication 2000 UNITS: at 07:14

## 2025-01-17 RX ADMIN — ACETAMINOPHEN 650 MG: 325 TABLET ORAL at 03:10

## 2025-01-17 RX ADMIN — ATORVASTATIN CALCIUM 20 MG: 20 TABLET, FILM COATED ORAL at 20:20

## 2025-01-17 RX ADMIN — BRIMONIDINE TARTRATE 1 DROP: 2 SOLUTION OPHTHALMIC at 07:14

## 2025-01-17 RX ADMIN — ASPIRIN 81 MG: 81 TABLET, COATED ORAL at 07:14

## 2025-01-17 RX ADMIN — TIMOLOL MALEATE 1 DROP: 5 SOLUTION OPHTHALMIC at 20:16

## 2025-01-17 RX ADMIN — ASPIRIN 81 MG: 81 TABLET, COATED ORAL at 20:20

## 2025-01-17 RX ADMIN — ENOXAPARIN SODIUM 30 MG: 100 INJECTION SUBCUTANEOUS at 07:14

## 2025-01-17 RX ADMIN — BRIMONIDINE TARTRATE 1 DROP: 2 SOLUTION OPHTHALMIC at 20:16

## 2025-01-17 RX ADMIN — TIMOLOL MALEATE 1 DROP: 5 SOLUTION OPHTHALMIC at 07:14

## 2025-01-17 RX ADMIN — LOSARTAN POTASSIUM 25 MG: 25 TABLET, FILM COATED ORAL at 20:20

## 2025-01-17 RX ADMIN — ACETAMINOPHEN 650 MG: 325 TABLET ORAL at 20:38

## 2025-01-17 ASSESSMENT — PAIN DESCRIPTION - ORIENTATION: ORIENTATION: RIGHT

## 2025-01-17 ASSESSMENT — PAIN SCALES - WONG BAKER: WONGBAKER_NUMERICALRESPONSE: NO HURT

## 2025-01-17 ASSESSMENT — PAIN SCALES - GENERAL
PAINLEVEL_OUTOF10: 4
PAINLEVEL_OUTOF10: 6
PAINLEVEL_OUTOF10: 3
PAINLEVEL_OUTOF10: 3
PAINLEVEL_OUTOF10: 0
PAINLEVEL_OUTOF10: 6

## 2025-01-17 ASSESSMENT — PAIN DESCRIPTION - LOCATION
LOCATION: HIP
LOCATION: HIP

## 2025-01-17 ASSESSMENT — PAIN DESCRIPTION - DESCRIPTORS: DESCRIPTORS: ACHING

## 2025-01-17 ASSESSMENT — PAIN - FUNCTIONAL ASSESSMENT: PAIN_FUNCTIONAL_ASSESSMENT: ACTIVITIES ARE NOT PREVENTED

## 2025-01-17 NOTE — PROGRESS NOTES
Department of Physical Medicine & Rehabilitation  Progress Note    Patient Identification:  Merari Cummins  [id number]  : 1940  Admit date: 1/10/2025    Chief Complaint: Closed displaced intertrochanteric fracture of right femur, initial encounter ([facility])    Subjective:   No acute events overnight.   Patient seen this afternoon sitting up in room. She  reports feeling well and looking forward to discharge home tomorrow. We discussed plans for home care, medications, and follow-ups.  Labs reviewed.     ROS: No f/c, n/v, cp     Objective:  Patient Vitals for the past 24 hrs:   BP Temp Temp src Pulse Resp SpO2 Weight   25 0916 -- -- -- -- -- 98 % --   25 0713 (!) 147/79 99.3 °F (37.4 °C) Oral 85 18 99 % --   25 0310 -- -- -- -- -- -- 53.3 kg (117 lb 8.1 oz)     Const: Alert. No distress, pleasant.   HEENT: Normocephalic, atraumatic. Normal sclera/conjunctiva. MMM.   CV: Regular rate and rhythm.   Resp: No respiratory distress. Lungs CTAB.   Abd: Soft, nontender, nondistended, NABS+   Ext: Mild RLE edema  MSK: decreased right hip ROM  Neuro: Alert, oriented, appropriately interactive.   Psych: Cooperative, appropriate mood and affect    Laboratory data: Available via EMR.   Last 24 hour lab  No results found for this or any previous visit (from the past 24 hour(s)).        Therapy progress:  Physical therapy:  Bed Mobility:  Overall Assistance Level: Modified Independent  Additional Factors: Increased time to complete  Sit>supine:  Assistance Level: Modified independent  Supine>sit:  Assistance Level: Modified independent  Transfers:  Surface: Wheelchair  Additional Factors: Increased time to complete  Device: Walker (RW)  Sit>stand:  Assistance Level: Modified independent  Stand>sit:  Assistance Level: Modified independent  Bed<>chair  Technique: Stand step  Assistance Level: Modified independent  Stand Pivot:     Lateral transfer:     Car transfer:  Assistance Level: Modified independent  Skilled  Clinical Factors: 2 cushions on chair to build up seat to simulate minivan, cues for hand placement  Ambulation:  Surface: Level surface, Carpet  Device: Rolling walker  Distance: 200' x2, 50' with 2 turns,  Activity: Within Unit  Additional Factors: Increased time to complete  Assistance Level: Modified independent  Gait Deviations: Slow bjorn, Decreased step length left, Decreased weight shift right  Skilled Clinical Factors: Step through gait good foot placement on turns  Stairs:  Stair Height: 6''  Device: Cane, One handrail (L)  Number of Stairs: 12  Additional Factors: Non-reciprocal going up, Non-reciprocal going down, Increased time to complete  Assistance Level: Modified independent  Skilled Clinical Factors: Self talks through sequence with cane and LHR, few cues but is not unsafe.  Skilled Clinical Factors - Comments: The pt enters the front of the home where there are 2 + 3 MISTI with LHR. Reviewed this on line with picture of her house and discussed using her SPC in the R hand. A helper would put the RW up on the step, she would go up 2 steps to get it, use it to get to the next set of steps then have the helper put it on the top step again and use the cane to go up the next 3 steps. Then transition back to the RW to get inside.  Curb:  Curb Height: 6''  Device: Rolling walker  Number of Curbs: 1  Additional Factors: Increased time to complete, Verbal cues  Assistance Level: Modified independent  Skilled Clinical Factors: Cues for sequence, tries to hold onto HR rather than B hands on RW.  Wheelchair:       Occupational therapy:   Feeding  Assistance Level: Modified independent  Skilled Clinical Factors: wears dentures, eating IND  Grooming/Oral Hygiene  Assistance Level: Modified independent  Skilled Clinical Factors: Pt stood at sink in w/c IND to brush teeth/hair  UE Bathing  Equipment Provided: Long-handled sponge  Assistance Level: Modified independent  Skilled Clinical Factors: Pt bathed on shower

## 2025-01-17 NOTE — PROGRESS NOTES
Physical Therapy  Facility/Department: [address] [facility]HAB  Rehabilitation Physical Therapy Treatment Note/Discharge Summary    NAME: Merari Cummins  : 1940 (84 y.o.)  MRN: [id number]  CODE STATUS: Full Code    Date of Service: 25       Restrictions:  Restrictions/Precautions: Fall Risk, Weight Bearing, Aspiration Risk  Lower Extremity Weight Bearing Restrictions  Right Lower Extremity Weight Bearing: Weight Bearing As Tolerated  Position Activity Restriction  Hip Precautions: Posterior hip precautions  Other Position/Activity Restrictions: No adduction, flexion beyond 90 degrees of operative hip. Keep \"toes to nabil\" in bed. Follow these restrictions for 3 months postoperatively or until restrictions are released from surgeon.     SUBJECTIVE  Subjective: Pt reports she is confident about going home tomorrow.  Pain: Pain 6/10 R lat hip-just took tylenol       OBJECTIVE  Cognition  Overall Cognitive Status: [facility]  Cognition Comment: Pt able to state/describe all hip precautions.  Orientation  Overall Orientation Status: Within Functional Limits  Orientation Level: Oriented X4    Functional Mobility  Bed Mobility  Overall Assistance Level: Modified Independent  Additional Factors: Increased time to complete  Bridging  Assistance Level: Modified independent  Roll Left  Assistance Level: Modified independent  Roll Right  Assistance Level: Modified independent  Sit to Supine  Assistance Level: Modified independent  Supine to Sit  Assistance Level: Modified independent  Scooting  Assistance Level: Modified independent  Balance  Sitting Balance: Modified independent   Standing Balance: Modified independent  (RW)  Transfers  Surface: Wheelchair  Additional Factors: Increased time to complete  Device: Walker (RW)  Sit to Stand  Assistance Level: Modified independent  Stand to Sit  Assistance Level: Modified independent  Bed To/From Chair  Technique: Stand step  Assistance Level: Modified independent  Car

## 2025-01-17 NOTE — PLAN OF CARE
Problem: Discharge Planning  Goal: Discharge to home or other facility with appropriate resources  1/17/2025 0906 by Claudio Reed RN  Outcome: Progressing  Flowsheets (Taken 1/17/2025 0906)  Discharge to home or other facility with appropriate resources: Identify barriers to discharge with patient and caregiver  1/17/2025 0125 by Sallie Crane RN  Outcome: Progressing     Problem: Safety - Adult  Goal: Free from fall injury  1/17/2025 0906 by Claudio Reed RN  Outcome: Progressing  Flowsheets (Taken 1/17/2025 0906)  Free From Fall Injury: Instruct family/caregiver on patient safety  1/17/2025 0125 by Sallie Crane RN  Outcome: Progressing     Problem: Skin/Tissue Integrity  Goal: Absence of new skin breakdown  Description: 1.  Monitor for areas of redness and/or skin breakdown  2.  Assess vascular access sites hourly  3.  Every 4-6 hours minimum:  Change oxygen saturation probe site  4.  Every 4-6 hours:  If on nasal continuous positive airway pressure, respiratory therapy assess nares and determine need for appliance change or resting period.  1/17/2025 0906 by Claudio Reed RN  Outcome: Progressing  1/17/2025 0125 by Sallie Crane RN  Outcome: Progressing     Problem: ABCDS Injury Assessment  Goal: Absence of physical injury  1/17/2025 0906 by Claudio Reed RN  Outcome: Progressing  Flowsheets (Taken 1/16/2025 0921)  Absence of Physical Injury: Implement safety measures based on patient assessment  1/17/2025 0125 by Sallie Crane RN  Outcome: Progressing     Problem: Musculoskeletal - Adult  Goal: Return mobility to safest level of function  1/17/2025 0906 by Claudio Reed RN  Outcome: Progressing  Flowsheets (Taken 1/17/2025 0906)  Return Mobility to Safest Level of Function:   Assess patient stability and activity tolerance for standing, transferring and ambulating with or without assistive devices   Assist with transfers and ambulation using safe patient handling equipment as needed

## 2025-01-17 NOTE — PROGRESS NOTES
Occupational Therapy  Facility/Department: [address] [facility]  Rehabilitation Occupational Therapy Daily Treatment Note  AM and PM Sessions:    Discharge Summary:    Date: 25  Patient Name: Merari Cummins       Room: D7J-2735/3256-01  MRN: [id number]  Account: [id number]   : 1940  (84 y.o.) Gender: female                    Past Medical History:  has a past medical history of Cataracts, bilateral, HTN (hypertension), and Vasculitis (HCC).  Past Surgical History:   has a past surgical history that includes hip surgery (Right, 2025).    Restrictions  Restrictions/Precautions: Fall Risk, Weight Bearing, Aspiration Risk  Hip Precautions: Posterior hip precautions  Other Position/Activity Restrictions: No adduction, flexion beyond 90 degrees of operative hip. Keep \"toes to nabil\" in bed. Follow these restrictions for 3 months postoperatively or until restrictions are released from surgeon.  Right Lower Extremity Weight Bearing: Weight Bearing As Tolerated  Equipment Used:  (RW)    Subjective  Subjective: Pt met in room in bed finishing breakfast with nurse Jason guan. Pt agreeable to ADL session for shower in prep for d/c home tomorrow. Rated her R hip pain 4/10.  Restrictions/Precautions: Fall Risk;Weight Bearing;Aspiration Risk             Objective     Cognition  Overall Cognitive Status: [facility]  Cognition Comment: Pt able to state/describe all hip precautions.  Orientation  Overall Orientation Status: Within Functional Limits  Orientation Level: Oriented X4         ADL  Feeding  Skilled Clinical Factors: wears dentures, eating IND  Grooming/Oral Hygiene  Assistance Level: Modified independent  Skilled Clinical Factors: Pt stood at sink in w/c IND to brush teeth/hair  Upper Extremity Bathing  Equipment Provided: Long-handled sponge  Assistance Level: Modified independent  Skilled Clinical Factors: Pt bathed on shower chair, OT re-ed on hand held shower & controls; pt completed modified IND

## 2025-01-17 NOTE — PROGRESS NOTES
Pt resting comfortably in chair  requesting to go to restroom and get ready for bed.. Denies any other  request at this time. All questions answered. Call light and bedside table in reach, along with all personal items.

## 2025-01-17 NOTE — CARE COORDINATION
SOCIAL WORK DISCHARGE SUMMARY        DATE OF DISCHARGE:  1-      LOCATION:   Home          Discharging to Facility/ Agency   Name:  [facility]    Address: [address] [place] [address]  Phone: [phone]  Fax: [phone]       TIME:   Family  10-11 am         PHARMACY:  [facility]        DME:    wh walker from [facility]/delivered on friday      Transportation Question  \"no\"      IMM:  1-    Referrals made:  [facility] for emergency response button, transportation andhomemaker.     NENA Cheung     Case Management   [phone]    1/17/2025  3:11 PM

## 2025-01-17 NOTE — PROGRESS NOTES
Patient admitted to rehab with closed displaced intertochanteric fx R femur .  A/Ox4. Transfers with walker x1. Mobility restrictions: WBAT posterolateral hpi precautions no adduction,  no flexion greater than 90 degrees. On regular diet, tolerating well. Medications taken whole with thins. On lovenox for DVT prophylaxis.  Skin: scattered bruising, incision to R hip with prineo intact. Oxygen: RA. LDA: NONE. Has been continent of bowel and continent of bladder. LBM 1/16. Chair/bed alarms in use and call light in reach. Will monitor for safety.

## 2025-01-17 NOTE — PROGRESS NOTES
Discharge medications are ready in inpatient pharmacy for weekend delivery.    01/17/25 3:23 PM

## 2025-01-17 NOTE — CARE COORDINATION
01/17/25 1509   IMM Letter   IMM Letter given to Patient/Family/Significant other/Guardian/POA/by: presented to patient by iliana lubin. Pt verbalized understanding.   IMM Letter date given: 01/17/25   IMM Letter time given: 6960

## 2025-01-17 NOTE — CARE COORDINATION
Met with pt to review DC for tomorrow.  IMM letter presented.   Family to transport home with  between 10 - 11 am .  She is using [facility].  She did meet with rep Molly from [facility] today.  Services will begin on Tuesday.  She confirmed desire to use [facility].  Pt was acccepted.    NENA Cheung     Case Management   [phone]    1/17/2025  3:10 PM

## 2025-01-18 VITALS
WEIGHT: 116.84 LBS | DIASTOLIC BLOOD PRESSURE: 70 MMHG | RESPIRATION RATE: 16 BRPM | HEART RATE: 81 BPM | OXYGEN SATURATION: 95 % | HEIGHT: 60 IN | BODY MASS INDEX: 22.94 KG/M2 | TEMPERATURE: 97.6 F | SYSTOLIC BLOOD PRESSURE: 145 MMHG

## 2025-01-18 PROCEDURE — 94760 N-INVAS EAR/PLS OXIMETRY 1: CPT

## 2025-01-18 PROCEDURE — 6370000000 HC RX 637 (ALT 250 FOR IP): Performed by: PHYSICAL MEDICINE & REHABILITATION

## 2025-01-18 RX ADMIN — ASPIRIN 81 MG: 81 TABLET, COATED ORAL at 07:37

## 2025-01-18 RX ADMIN — Medication 2000 UNITS: at 07:37

## 2025-01-18 RX ADMIN — BRIMONIDINE TARTRATE 1 DROP: 2 SOLUTION OPHTHALMIC at 07:38

## 2025-01-18 RX ADMIN — ACETAMINOPHEN 650 MG: 325 TABLET ORAL at 03:18

## 2025-01-18 RX ADMIN — TIMOLOL MALEATE 1 DROP: 5 SOLUTION OPHTHALMIC at 07:38

## 2025-01-18 ASSESSMENT — PAIN SCALES - GENERAL: PAINLEVEL_OUTOF10: 0

## 2025-01-18 NOTE — PLAN OF CARE
Problem: Discharge Planning  Goal: Discharge to home or other facility with appropriate resources  Outcome: Completed     Problem: Safety - Adult  Goal: Free from fall injury  Outcome: Completed     Problem: Skin/Tissue Integrity  Goal: Absence of new skin breakdown  Description: 1.  Monitor for areas of redness and/or skin breakdown  2.  Assess vascular access sites hourly  3.  Every 4-6 hours minimum:  Change oxygen saturation probe site  4.  Every 4-6 hours:  If on nasal continuous positive airway pressure, respiratory therapy assess nares and determine need for appliance change or resting period.  Outcome: Completed     Problem: ABCDS Injury Assessment  Goal: Absence of physical injury  Outcome: Completed     Problem: Musculoskeletal - Adult  Goal: Return mobility to safest level of function  Outcome: Completed     Problem: Musculoskeletal - Adult  Goal: Maintain proper alignment of affected body part  Outcome: Completed     Problem: Musculoskeletal - Adult  Goal: Return ADL status to a safe level of function  Outcome: Completed     Problem: Pain  Goal: Verbalizes/displays adequate comfort level or baseline comfort level  Outcome: Completed

## 2025-01-18 NOTE — PROGRESS NOTES
Pt sitting up in chair requesting to get ready for bed. Pt denies pain. Admitted to ARU with R hip fx and R hip hemiarthroplasty with Dr. Jiang . SI well-approximated with surgical glue and prinesheyla and PK. Pt anticipates discharge to home tomorrow. Lungs clear. No sob or cough. On RA. Belly round and soft with active BS. LBM yesterday. Pt continent of B and B. No edema noted. Pt up from sit to stand with Mod Ind and walker and GB. Pt toilets independent and stood at sink to brush teeth independent. Pt able to get into bed on own. Pt doffed day clothes and donned personal nightgown with set up. Hip precautions reviewed and followed. Abductor pillow  in between legs. Call light in reach. Bed alarm on.    93.24

## 2025-01-18 NOTE — PLAN OF CARE
Problem: Safety - Adult  Goal: Free from fall injury  Outcome: Completed     Problem: Skin/Tissue Integrity  Goal: Absence of new skin breakdown  Description: 1.  Monitor for areas of redness and/or skin breakdown  2.  Assess vascular access sites hourly  3.  Every 4-6 hours minimum:  Change oxygen saturation probe site  4.  Every 4-6 hours:  If on nasal continuous positive airway pressure, respiratory therapy assess nares and determine need for appliance change or resting period.  Outcome: Completed     Problem: ABCDS Injury Assessment  Goal: Absence of physical injury  Outcome: Completed     Problem: Musculoskeletal - Adult  Goal: Return mobility to safest level of function  Outcome: Completed  Goal: Maintain proper alignment of affected body part  Outcome: Completed  Goal: Return ADL status to a safe level of function  Outcome: Completed     Problem: Pain  Goal: Verbalizes/displays adequate comfort level or baseline comfort level  Outcome: Completed

## 2025-01-18 NOTE — PROGRESS NOTES
Provision of Current Reconciled Medication List to Subsequent Provider at Discharge  []No, current reconciled medication list not provided to the subsequent provider.  []Yes, current reconciled medication list provided to the subsequent provider. (**Select route of transmission below**)   [] Via Electronic Health Record   []Via Health Information Exchange Organization  [] Verbal (e.g. in person, telephone, video conferencing)  []Paper-based (e.g. fax, copies, printouts)   []Other Methods (e.g. texting, email, CDs)    Provision of Current Reconciled Medication List to Patient at Discharge  []No, current reconciled medication list not provided to the patient, family and/or caregiver.   [x]Yes, current reconciled medication list provided to the patient, family and/or caregiver.  (**Select route of transmission below**)   [] Via Electronic Health Record (e.g., electronic access to patient portal)   [] Via Health Information Exchange Organization  [] Verbal (e.g. in person, telephone, video conferencing)  []Paper-based (e.g. fax, copies, printouts)   []Other Methods (e.g. texting, email, CDs)    High Risk Drug Classes:  Use and Indication    Is taking: Check if the pt is taking any medications by pharmacological classification, not how it is used, in the following classes  Indication noted: If column 1 is checked, check if there is an indication noted for all meds in the drug class Is taking  (check all that apply) Indication noted (check all that apply)   Antipsychotic [] []   Anticoagulant [] []   Antibiotic [] []   Opioid [] []   Antiplatelet [x] []   Hypoglycemic (including insulin) [] []   None of the above []     Special Treatments, Procedures, and Programs    Check all of the following treatments, procedures, and programs that apply at discharge. At Discharge (check all that apply)   Cancer Treatments   A1. Chemotherapy []           A2. IV []           A3. Oral []           A10. Other []   B1. Radiation []

## 2025-01-18 NOTE — PROGRESS NOTES
Patient agreed to discharge.     Patient and family in room for discharge instructions, questions answered with verbal acknowledgement received, papers and prescriptions given to patient. Staff transported patient via wheel chair to front [facility] and discharged home with all documented belongings. Electronically signed by Griselda Garcia RN on 1/18/2025 at 11:37 AM

## 2025-01-18 NOTE — PROGRESS NOTES
Patient admitted to rehab with closed fracture of right hip,(R Hip Hemiarthroplasty).  A/Ox4. Transfers with rolling walker and gait belt CGA. Mobility restrictions: WBAT, posterolateral approach hip precautions. On Regular diet, tolerating well. Medications taken whole with thin liquids. On Aspirin and Lovenox for DVT prophylaxis.  Skin: surgical incision right hip w/Prineo, skin tear L elbow, PK scabbed over and resolved. Oxygen: RA. LDA: none . Has been continent of bowel and bladder. LBM 1/18/2025. Chair/bed alarms in use and call light in reach. Will monitor for safety. Electronically signed by Griselda Garcia RN on 1/18/2025 at 10:20 AM

## 2025-01-21 NOTE — DISCHARGE SUMMARY
Physical Medicine & Rehabilitation  Discharge Summary     Patient Identification:  Merari Cummins  : 1940  Admit date: 1/10/2025  Discharge date: 2025   Attending provider: Marilou Couch MD        Primary care provider: Jaquan Smith MD     Discharge Diagnoses:   Patient Active Problem List   Diagnosis    Closed fracture of right distal radius    Intertrochanteric fracture of right femur, closed, initial encounter ([facility])    Closed fracture of right hip ([facility])    Closed displaced intertrochanteric fracture of right femur, initial encounter ([facility])       History of Present Illness/Acute Hospital Course:  Patient is an 85 yo female with pmh HTN, HLD, and 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 She fell approximately 12 hours before coming into the ER.  States that she got her slipper caught on loose dustin resulting in fall. Denies head trauma or loss of consciousness.  Initially able to ambulate but then hip became progressively more painful and she was unable to bear weight on it.  Diagnostic work-up revealed displaced right femoral neck fracture. Patient underwent right hemiarthroplasty ( with Dr. Jiang). Now WBAT RLE with posterlateral hip precautions. Course complicated by leukocytosis, anemia, labile blood pressure. Now presents to ARU with impaired mobility and self-care below her baseline.     Inpatient Rehabilitation Course:   Merari Cummins is a 84 y.o. female admitted to inpatient rehabilitation on 1/10/2025 with Closed displaced intertrochanteric fracture of right femur, initial encounter ([facility]).  The patient participated in an aggressive multidisciplinary inpatient rehabilitation program involving 3 hours of therapy per day, at least 5 days per week. She made good progress with regard to functional mobility, balance, compensatory strategies. Now overall modified independent for mobility and ADLs.

## 2025-01-22 ENCOUNTER — TELEPHONE (OUTPATIENT)
Dept: ORTHOPEDIC SURGERY | Age: 85
End: 2025-01-22

## 2025-01-22 NOTE — TELEPHONE ENCOUNTER
Medical Facility Question     [facility]  Contact Name: KAREN  Contact Number: [phone]  Request or Information: JUST WANTED TO ADVISE THAT PATIENT IS TAKING TYLENOL 500 MG 1 TABLET 4 TO 6  FOR PAIN DIDN'T GET NARCOTIC FILLED ONLY USING TYLENOL FOR PAIN

## 2025-01-23 ENCOUNTER — OFFICE VISIT (OUTPATIENT)
Dept: ORTHOPEDIC SURGERY | Age: 85
End: 2025-01-23

## 2025-01-23 VITALS — WEIGHT: 116 LBS | HEIGHT: 60 IN | BODY MASS INDEX: 22.78 KG/M2

## 2025-01-23 DIAGNOSIS — Z96.649 S/P HIP HEMIARTHROPLASTY: Primary | ICD-10-CM

## 2025-01-23 DIAGNOSIS — S72.001D CLOSED FRACTURE OF RIGHT HIP WITH ROUTINE HEALING, SUBSEQUENT ENCOUNTER: ICD-10-CM

## 2025-01-23 PROCEDURE — 99024 POSTOP FOLLOW-UP VISIT: CPT | Performed by: ORTHOPAEDIC SURGERY

## 2025-01-23 NOTE — PROGRESS NOTES
Merari Cummins  [id number]  January 23, 2025    Chief Complaint   Patient presents with    Post-Op Check     Right hip Hemiarthroplasty; DOS 1/9/25.       History: The patient is an 84-year-old female who is here for evaluation of her right hip.  She is doing extremely well.  She reports no pain in the hip today.  She does periodically take Tylenol.  She underwent a right hip hemiarthroplasty on 1/9/2025.  She is ambulating with a walker.  Prior to her fall, she did live independently and was very self-sufficient.  She is currently receiving home health and physical therapy.    The patient's  past medical history, medications, allergies,  family history, social history, and have been reviewed, and dated and are recorded in the chart.  Pertinent items are noted in HPI.  Review of systems reviewed from Pertinent History Form dated on 1/23 and available in the patient's chart under the Media tab.     Vitals:  Ht 1.524 m (5')   Wt 52.6 kg (116 lb)   BMI 22.65 kg/m²     Physical: On examination today, the patient is alert and oriented x 3.  The right hip incision is clean and dry.  There is no evidence of erythema or warmth. There is no evidence of drainage.  She is neurovascularly intact distally. There is no evidence of DVT.  She has minimal to no pain with light rotation of the right hip. Leg lengths are symmetric.    X-rays: AP pelvis and 2 views of the right hip obtained in the office today were extensively reviewed.  The prosthesis is well aligned.  There is no evidence of fracture or subsidence.    Impression: Status post right hip hemiarthroplasty    Plan: At this time, the patient will continue to work on range of motion and strengthening.  She will continue to work on her balance and gait.  The patient will follow-up with me in 4 weeks and we will reassess her then.  At follow-up, we will then consider releasing her.  At follow-up, an AP pelvis and 2 views of the right hip will be obtained.  The patient will

## 2025-02-27 ENCOUNTER — OFFICE VISIT (OUTPATIENT)
Dept: ORTHOPEDIC SURGERY | Age: 85
End: 2025-02-27

## 2025-02-27 VITALS — HEIGHT: 60 IN | BODY MASS INDEX: 22.65 KG/M2

## 2025-02-27 DIAGNOSIS — Z96.649 S/P HIP HEMIARTHROPLASTY: Primary | ICD-10-CM

## 2025-02-27 PROCEDURE — 99024 POSTOP FOLLOW-UP VISIT: CPT | Performed by: ORTHOPAEDIC SURGERY

## 2025-02-27 NOTE — PROGRESS NOTES
Merari Cummins  [id number]  February 27, 2025    Chief Complaint   Patient presents with    Post-Op Check     1/9/25 R hip marily       History: The patient is an 84-year-old female who is here for evaluation of her right hip.  She is doing extremely well.  She reports minimal pain in the hip today.  She does periodically take Tylenol.  She underwent a right hip hemiarthroplasty on 1/9/2025.  She is now 7 weeks postop.  Has been ambulating with a cane.  She often ambulates without the cane at home.     The patient's  past medical history, medications, allergies,  family history, social history, and have been reviewed, and dated and are recorded in the chart.  Pertinent items are noted in HPI.  Review of systems reviewed from Pertinent History Form dated on 1/23 and available in the patient's chart under the Media tab.     Vitals:  Ht 1.524 m (5')   BMI 22.65 kg/m²     Physical: On examination today, the patient is alert and oriented x 3.  The right hip incision is completely healed .  There is no evidence of erythema or warmth. There is no evidence of drainage.  She is neurovascularly intact distally. There is no evidence of DVT.  She has no pain with light rotation of the right hip. Leg lengths are symmetric.    X-rays: AP pelvis and 2 views of the right hip obtained in the office today were extensively reviewed.  The prosthesis is well aligned.  There is no evidence of fracture or subsidence.    Impression: Status post right hip hemiarthroplasty    Plan: At this time, the patient will continue to work on range of motion and strengthening.  She will continue to work on her balance and gait.  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  She has been on a leave of absence.  The patient was given a note to return as a  on April 15.  She was also given restrictions to sit down whenever needed.  The patient will follow-up with me in 8 weeks and we will reassess her then.  At

## 2025-04-29 ENCOUNTER — OFFICE VISIT (OUTPATIENT)
Dept: ORTHOPEDIC SURGERY | Age: 85
End: 2025-04-29
Payer: MEDICARE

## 2025-04-29 VITALS — HEIGHT: 60 IN | BODY MASS INDEX: 22.58 KG/M2 | WEIGHT: 115 LBS

## 2025-04-29 DIAGNOSIS — Z96.649 S/P HIP HEMIARTHROPLASTY: Primary | ICD-10-CM

## 2025-04-29 PROCEDURE — 1125F AMNT PAIN NOTED PAIN PRSNT: CPT | Performed by: ORTHOPAEDIC SURGERY

## 2025-04-29 PROCEDURE — 1159F MED LIST DOCD IN RCRD: CPT | Performed by: ORTHOPAEDIC SURGERY

## 2025-04-29 PROCEDURE — 1123F ACP DISCUSS/DSCN MKR DOCD: CPT | Performed by: ORTHOPAEDIC SURGERY

## 2025-04-29 PROCEDURE — G8400 PT W/DXA NO RESULTS DOC: HCPCS | Performed by: ORTHOPAEDIC SURGERY

## 2025-04-29 PROCEDURE — G8420 CALC BMI NORM PARAMETERS: HCPCS | Performed by: ORTHOPAEDIC SURGERY

## 2025-04-29 PROCEDURE — G8427 DOCREV CUR MEDS BY ELIG CLIN: HCPCS | Performed by: ORTHOPAEDIC SURGERY

## 2025-04-29 PROCEDURE — 99213 OFFICE O/P EST LOW 20 MIN: CPT | Performed by: ORTHOPAEDIC SURGERY

## 2025-04-29 PROCEDURE — 1090F PRES/ABSN URINE INCON ASSESS: CPT | Performed by: ORTHOPAEDIC SURGERY

## 2025-04-29 PROCEDURE — 1036F TOBACCO NON-USER: CPT | Performed by: ORTHOPAEDIC SURGERY

## 2025-04-29 NOTE — PROGRESS NOTES
Merari Cummins  [id number]  April 29, 2025    Chief Complaint   Patient presents with    Follow-up     Post Op Rt Hip Hemiarthroplasty  done on 1/9/25       History: The patient is an 84-year-old female who is here for evaluation of her right hip.  She is doing extremely well.  She reports minimal pain in the hip today.  She does periodically take Tylenol.  She underwent a right hip hemiarthroplasty on 1/9/2025.  She returned to volunteering at the [employer].      The patient's  past medical history, medications, allergies,  family history, social history, and have been reviewed, and dated and are recorded in the chart.  Pertinent items are noted in HPI.  Review of systems reviewed from Pertinent History Form dated on 1/23 and available in the patient's chart under the Media tab.     Vitals:  Ht 1.524 m (5')   Wt 52.2 kg (115 lb)   BMI 22.46 kg/m²     Physical: On examination today, the patient is alert and oriented x 3.  The right hip incision is completely healed .  There is no evidence of erythema or warmth. There is no evidence of drainage.  She is neurovascularly intact distally. There is no evidence of DVT.  She has no pain with light rotation of the right hip. Leg lengths are symmetric.    X-rays: AP pelvis and 2 views of the right hip obtained in the office today were extensively reviewed.  The prosthesis is well aligned.  There is no evidence of fracture or subsidence.    Impression: Status post right hip hemiarthroplasty    Plan: At this time, the patient will continue to work on range of motion and strengthening.  She will continue to work on her balance and gait.  She will continue to use the cane when walking long distances.  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  The patient can follow-up with me since.      Orders Placed This Encounter   Procedures    XR HIP 2-3 VW W PELVIS RIGHT     Standing Status:   Future     Number of Occurrences:   1     Expected Date:

## (undated) DEVICE — SUTURE VICRYL + 1 L27IN ABSRB UD CT-1 L36MM 1/2 CIR TAPR PNT VCP261H

## (undated) DEVICE — SYRINGE MED 20ML STD CLR PLAS LUERLOCK TIP N CTRL DISP

## (undated) DEVICE — INTENDED FOR TISSUE SEPARATION, AND OTHER PROCEDURES THAT REQUIRE A SHARP SURGICAL BLADE TO PUNCTURE OR CUT.: Brand: BARD-PARKER ® STAINLESS STEEL BLADES

## (undated) DEVICE — GOWN,SIRUS,POLYRNF,BRTHSLV,XL,30/CS: Brand: MEDLINE

## (undated) DEVICE — ELECTRODE BLDE L6.5IN CAUT EXT DISP

## (undated) DEVICE — 3M™ COBAN™ NL STERILE NON-LATEX SELF-ADHERENT WRAP, 2086S, 6 IN X 5 YD (15 CM X 4,5 M), 12 ROLLS/CASE: Brand: 3M™ COBAN™

## (undated) DEVICE — ADHESIVE SKIN CLOSURE XL 42 CM 2.7 CC MESH LIQUIBAND SECUR

## (undated) DEVICE — HANDPIECE SET WITH HIGH FLOW TIP AND SUCTION TUBE: Brand: INTERPULSE

## (undated) DEVICE — GLOVE SURG SZ 85 L12IN FNGR THK79MIL GRN LTX FREE

## (undated) DEVICE — PAD,ABDOMINAL,8"X7.5",STERILE,LF,1/PK: Brand: MEDLINE

## (undated) DEVICE — 2108 SERIES SAGITTAL BLADE AGGRESSIVE  (25.0 X 1.19 X 85.0MM)

## (undated) DEVICE — ADHESIVE SKIN CLOSURE WND 8.661X1.5 IN 22 CM LIQUIBAND SECUR

## (undated) DEVICE — ANTI-EMBOLISM STOCKINGS,THIGH LENGTH,LARGE-LONG-SIZE J: Brand: T.E.D.

## (undated) DEVICE — CONTAINER SPEC 165OZ POLYPR PATH SNAP LOK CAP W/ LID

## (undated) DEVICE — PILLOW POS W15XH6XL22IN RASPBERRY FOAM ABD W/ STRP DISP FOR

## (undated) DEVICE — SOLUTION IRRIG 1000ML 0.9% SOD CHL USP POUR PLAS BTL

## (undated) DEVICE — MERCY HEALTH WEST TURNOVER: Brand: MEDLINE INDUSTRIES, INC.

## (undated) DEVICE — 3M™ STERI-DRAPE™ U-DRAPE 1015: Brand: STERI-DRAPE™

## (undated) DEVICE — FEMORAL CANAL TIP, IRRIGATION/SUCTION

## (undated) DEVICE — CLEANER,CAUTERY TIP,2X2",STERILE: Brand: MEDLINE

## (undated) DEVICE — GARMENT COMPR STD FOR 17IN CALF UNIF THER FLOTRN

## (undated) DEVICE — TOTAL HIP: Brand: MEDLINE INDUSTRIES, INC.

## (undated) DEVICE — SUTURE VICRYL + SZ 2-0 L27IN ABSRB CLR CT-1 1/2 CIR TAPERCUT VCP259H

## (undated) DEVICE — TRANSFER SET 3": Brand: MEDLINE INDUSTRIES, INC.

## (undated) DEVICE — DRAPE,HIP,W/POUCHES,STERILE: Brand: MEDLINE

## (undated) DEVICE — SPONGE GZ W4XL4IN COT 12 PLY TYP VII WVN C FLD DSGN STERILE

## (undated) DEVICE — 450 ML BOTTLE OF 0.05% CHLORHEXIDINE GLUCONATE IN 99.95% STERILE WATER FOR IRRIGATION, USP AND APPLICATOR.: Brand: IRRISEPT ANTIMICROBIAL WOUND LAVAGE

## (undated) DEVICE — ELECTRODE PT RET AD L9FT HI MOIST COND ADH HYDRGEL CORDED

## (undated) DEVICE — 1010 S-DRAPE TOWEL DRAPE 10/BX: Brand: STERI-DRAPE™

## (undated) DEVICE — HYPODERMIC SAFETY NEEDLE: Brand: MONOJECT

## (undated) DEVICE — GLOVE SURG SZ 85 L12IN FNGR ORTHO 126MIL CRM LTX FREE

## (undated) DEVICE — SOLUTION IRRIG 3000ML 0.9% SOD CHL USP UROMATIC PLAS CONT

## (undated) DEVICE — SUTURE MONOCRYL STRATAFIX SPRL SZ 3-0 L12IN ABSRB UD FS-1 L30X30CM SXMP2B410